# Patient Record
Sex: FEMALE | Race: WHITE | Employment: FULL TIME | URBAN - METROPOLITAN AREA
[De-identification: names, ages, dates, MRNs, and addresses within clinical notes are randomized per-mention and may not be internally consistent; named-entity substitution may affect disease eponyms.]

---

## 2017-01-10 ENCOUNTER — TELEPHONE (OUTPATIENT)
Dept: INTERNAL MEDICINE CLINIC | Facility: CLINIC | Age: 57
End: 2017-01-10

## 2017-01-10 DIAGNOSIS — C50.919 FAMILIAL CANCER OF BREAST, UNSPECIFIED LATERALITY (HCC): ICD-10-CM

## 2017-01-10 DIAGNOSIS — Z84.81 FAMILY HISTORY OF BRCA1 GENE POSITIVE: ICD-10-CM

## 2017-01-10 DIAGNOSIS — Z12.31 ENCOUNTER FOR SCREENING MAMMOGRAM FOR BREAST CANCER: Primary | ICD-10-CM

## 2017-01-10 NOTE — TELEPHONE ENCOUNTER
Mammo ordered. Mayi Martinez. Lorenzo Danielle MD  Diplomate, American Board of Internal Medicine  [facility]  [address] [facility],[address], [address], [facility], [address] [place] [address]  T: [phone]; F: Charlotte 5

## 2017-01-31 ENCOUNTER — HOSPITAL ENCOUNTER (OUTPATIENT)
Age: 57
Discharge: HOME | End: 2017-01-31
Attending: FAMILY MEDICINE
Payer: COMMERCIAL

## 2017-01-31 VITALS
TEMPERATURE: 99 F | HEART RATE: 75 BPM | SYSTOLIC BLOOD PRESSURE: 106 MMHG | DIASTOLIC BLOOD PRESSURE: 64 MMHG | HEIGHT: 66 IN | BODY MASS INDEX: 21.69 KG/M2 | WEIGHT: 135 LBS | OXYGEN SATURATION: 98 % | RESPIRATION RATE: 18 BRPM

## 2017-01-31 DIAGNOSIS — J02.9 ACUTE VIRAL PHARYNGITIS: ICD-10-CM

## 2017-01-31 DIAGNOSIS — J06.9 UPPER RESPIRATORY TRACT INFECTION, UNSPECIFIED TYPE: ICD-10-CM

## 2017-01-31 DIAGNOSIS — J98.01 ACUTE BRONCHOSPASM: Primary | ICD-10-CM

## 2017-01-31 LAB — POCT RAPID STREP: NEGATIVE

## 2017-01-31 PROCEDURE — 99214 OFFICE O/P EST MOD 30 MIN: CPT

## 2017-01-31 PROCEDURE — 94640 AIRWAY INHALATION TREATMENT: CPT

## 2017-01-31 PROCEDURE — 87430 STREP A AG IA: CPT

## 2017-01-31 PROCEDURE — 87081 CULTURE SCREEN ONLY: CPT

## 2017-01-31 RX ORDER — ALBUTEROL SULFATE 90 UG/1
2 AEROSOL, METERED RESPIRATORY (INHALATION) EVERY 4 HOURS PRN
Qty: 1 INHALER | Refills: 0 | Status: SHIPPED | OUTPATIENT
Start: 2017-01-31 | End: 2018-07-06

## 2017-01-31 RX ORDER — BENZONATATE 200 MG/1
200 CAPSULE ORAL 3 TIMES DAILY PRN
Qty: 30 CAPSULE | Refills: 0 | Status: SHIPPED | OUTPATIENT
Start: 2017-01-31 | End: 2017-02-10

## 2017-01-31 RX ORDER — PROMETHAZINE HYDROCHLORIDE AND CODEINE PHOSPHATE 6.25; 1 MG/5ML; MG/5ML
5 SYRUP ORAL NIGHTLY PRN
Qty: 120 ML | Refills: 0 | Status: SHIPPED | OUTPATIENT
Start: 2017-01-31 | End: 2017-02-10

## 2017-01-31 RX ORDER — PREDNISONE 20 MG/1
TABLET ORAL
Qty: 10 TABLET | Refills: 0 | Status: SHIPPED | OUTPATIENT
Start: 2017-01-31 | End: 2017-02-20 | Stop reason: ALTCHOICE

## 2017-01-31 RX ORDER — ALBUTEROL SULFATE 2.5 MG/3ML
2.5 SOLUTION RESPIRATORY (INHALATION) EVERY 4 HOURS PRN
Qty: 1 BOX | Refills: 0 | Status: SHIPPED | OUTPATIENT
Start: 2017-01-31 | End: 2017-02-20 | Stop reason: ALTCHOICE

## 2017-01-31 RX ORDER — PREDNISONE 20 MG/1
40 TABLET ORAL ONCE
Status: COMPLETED | OUTPATIENT
Start: 2017-01-31 | End: 2017-01-31

## 2017-01-31 RX ORDER — IPRATROPIUM BROMIDE AND ALBUTEROL SULFATE 2.5; .5 MG/3ML; MG/3ML
3 SOLUTION RESPIRATORY (INHALATION) ONCE
Status: COMPLETED | OUTPATIENT
Start: 2017-01-31 | End: 2017-01-31

## 2017-01-31 NOTE — ED PROVIDER NOTES
Patient Seen in: [facility]    History   Patient presents with:  Sore Throat    Stated Complaint: headache / Cassidy Lorenzo / chest tightness x 4 days    HPI    This 72-year-old female presents to the office with a four-day history • Cancer Sister 46     Stage 4 Ovarian CA   • Ovarian Cancer Sister 43   • Breast Cancer Sister      one sis ovarina ca @52/other sis breast ca 43         Smoking Status: Never Smoker                      Smokeless Status: Never Used while in the office. Repeat lung exam shows increased air exchange. Peak flows have improved. Patient states her chest tightness has improved. She is stable for discharge to home.     Symptomatic treatment for URI, sore throat and bronchospasm is discus bronchospasm    benzonatate 200 MG Oral Cap  Take 1 capsule (200 mg total) by mouth 3 (three) times daily as needed for cough. Qty: 30 capsule Refills: 0  Associated Diagnoses:Upper respiratory tract infection, unspecified type    promethazine-codeine 6. 2

## 2017-01-31 NOTE — ED INITIAL ASSESSMENT (HPI)
Pt began 4 days ago with sore throat chills and sweats, and a congested tight chest that wont break up.   She has been on tylenol and getting headaches

## 2017-02-20 ENCOUNTER — LAB ENCOUNTER (OUTPATIENT)
Dept: LAB | Age: 57
End: 2017-02-20
Attending: INTERNAL MEDICINE
Payer: COMMERCIAL

## 2017-02-20 ENCOUNTER — HOSPITAL ENCOUNTER (OUTPATIENT)
Dept: MAMMOGRAPHY | Age: 57
Discharge: HOME | End: 2017-02-20
Attending: INTERNAL MEDICINE
Payer: COMMERCIAL

## 2017-02-20 ENCOUNTER — OFFICE VISIT (OUTPATIENT)
Dept: INTERNAL MEDICINE CLINIC | Facility: CLINIC | Age: 57
End: 2017-02-20

## 2017-02-20 VITALS
DIASTOLIC BLOOD PRESSURE: 68 MMHG | HEART RATE: 74 BPM | BODY MASS INDEX: 23 KG/M2 | RESPIRATION RATE: 12 BRPM | OXYGEN SATURATION: 96 % | SYSTOLIC BLOOD PRESSURE: 108 MMHG | TEMPERATURE: 98 F | WEIGHT: 142.5 LBS

## 2017-02-20 DIAGNOSIS — Z84.81 FAMILY HISTORY OF BRCA1 GENE POSITIVE: ICD-10-CM

## 2017-02-20 DIAGNOSIS — Z00.00 ROUTINE GENERAL MEDICAL EXAMINATION AT A HEALTH CARE FACILITY: ICD-10-CM

## 2017-02-20 DIAGNOSIS — Z00.00 ROUTINE GENERAL MEDICAL EXAMINATION AT A HEALTH CARE FACILITY: Primary | ICD-10-CM

## 2017-02-20 DIAGNOSIS — Z12.31 ENCOUNTER FOR SCREENING MAMMOGRAM FOR BREAST CANCER: ICD-10-CM

## 2017-02-20 DIAGNOSIS — C50.919 FAMILIAL CANCER OF BREAST, UNSPECIFIED LATERALITY (HCC): ICD-10-CM

## 2017-02-20 LAB
ALBUMIN SERPL-MCNC: 4.2 G/DL (ref 3.5–4.8)
ALP LIVER SERPL-CCNC: 70 U/L (ref 41–108)
ALT SERPL-CCNC: 29 U/L (ref 14–54)
AST SERPL-CCNC: 21 U/L (ref 15–41)
BASOPHILS # BLD AUTO: 0.08 X10(3) UL (ref 0–0.1)
BASOPHILS NFR BLD AUTO: 1.2 %
BILIRUB SERPL-MCNC: 0.8 MG/DL (ref 0.1–2)
BILIRUB UR QL STRIP.AUTO: NEGATIVE
BUN BLD-MCNC: 20 MG/DL (ref 8–20)
CALCIUM BLD-MCNC: 9.4 MG/DL (ref 8.3–10.3)
CHLORIDE: 107 MMOL/L (ref 101–111)
CHOLEST SMN-MCNC: 222 MG/DL (ref ?–200)
CO2: 29 MMOL/L (ref 22–32)
COLOR UR AUTO: YELLOW
CREAT BLD-MCNC: 0.81 MG/DL (ref 0.55–1.02)
EOSINOPHIL # BLD AUTO: 0.2 X10(3) UL (ref 0–0.3)
EOSINOPHIL NFR BLD AUTO: 3 %
ERYTHROCYTE [DISTWIDTH] IN BLOOD BY AUTOMATED COUNT: 13.3 % (ref 11.5–16)
EST. AVERAGE GLUCOSE BLD GHB EST-MCNC: 120 MG/DL (ref 68–126)
GLUCOSE BLD-MCNC: 94 MG/DL (ref 70–99)
GLUCOSE UR STRIP.AUTO-MCNC: NEGATIVE MG/DL
HBA1C MFR BLD HPLC: 5.8 % (ref ?–5.7)
HCT VFR BLD AUTO: 40.7 % (ref 34–50)
HDLC SERPL-MCNC: 82 MG/DL (ref 45–?)
HDLC SERPL: 2.71 {RATIO} (ref ?–4.44)
HGB BLD-MCNC: 13.3 G/DL (ref 12–16)
IMMATURE GRANULOCYTE COUNT: 0.01 X10(3) UL (ref 0–1)
IMMATURE GRANULOCYTE RATIO %: 0.1 %
KETONES UR STRIP.AUTO-MCNC: NEGATIVE MG/DL
LDLC SERPL CALC-MCNC: 127 MG/DL (ref ?–130)
LEUKOCYTE ESTERASE UR QL STRIP.AUTO: NEGATIVE
LYMPHOCYTES # BLD AUTO: 2.15 X10(3) UL (ref 0.9–4)
LYMPHOCYTES NFR BLD AUTO: 32 %
M PROTEIN MFR SERPL ELPH: 7.6 G/DL (ref 6.1–8.3)
MCH RBC QN AUTO: 29.5 PG (ref 27–33.2)
MCHC RBC AUTO-ENTMCNC: 32.7 G/DL (ref 31–37)
MCV RBC AUTO: 90.2 FL (ref 81–100)
MONOCYTES # BLD AUTO: 0.6 X10(3) UL (ref 0.1–0.6)
MONOCYTES NFR BLD AUTO: 8.9 %
NEUTROPHIL ABS PRELIM: 3.67 X10 (3) UL (ref 1.3–6.7)
NEUTROPHILS # BLD AUTO: 3.67 X10(3) UL (ref 1.3–6.7)
NEUTROPHILS NFR BLD AUTO: 54.8 %
NITRITE UR QL STRIP.AUTO: NEGATIVE
NONHDLC SERPL-MCNC: 140 MG/DL (ref ?–130)
PH UR STRIP.AUTO: 7 [PH] (ref 4.5–8)
PLATELET # BLD AUTO: 357 10(3)UL (ref 150–450)
POTASSIUM SERPL-SCNC: 4.4 MMOL/L (ref 3.6–5.1)
PROT UR STRIP.AUTO-MCNC: NEGATIVE MG/DL
RBC # BLD AUTO: 4.51 X10(6)UL (ref 3.8–5.1)
RBC UR QL AUTO: NEGATIVE
RED CELL DISTRIBUTION WIDTH-SD: 43.6 FL (ref 35.1–46.3)
SODIUM SERPL-SCNC: 140 MMOL/L (ref 136–144)
SP GR UR STRIP.AUTO: 1.02 (ref 1–1.03)
TRIGLYCERIDES: 67 MG/DL (ref ?–150)
TSI SER-ACNC: 1.31 MIU/ML (ref 0.35–5.5)
UROBILINOGEN UR STRIP.AUTO-MCNC: <2 MG/DL
VLDL: 13 MG/DL (ref 5–40)
WBC # BLD AUTO: 6.7 X10(3) UL (ref 4–13)

## 2017-02-20 PROCEDURE — 80053 COMPREHEN METABOLIC PANEL: CPT

## 2017-02-20 PROCEDURE — 80061 LIPID PANEL: CPT

## 2017-02-20 PROCEDURE — 82306 VITAMIN D 25 HYDROXY: CPT

## 2017-02-20 PROCEDURE — 83036 HEMOGLOBIN GLYCOSYLATED A1C: CPT

## 2017-02-20 PROCEDURE — 81003 URINALYSIS AUTO W/O SCOPE: CPT

## 2017-02-20 PROCEDURE — 85025 COMPLETE CBC W/AUTO DIFF WBC: CPT

## 2017-02-20 PROCEDURE — 77063 BREAST TOMOSYNTHESIS BI: CPT

## 2017-02-20 PROCEDURE — 77067 SCR MAMMO BI INCL CAD: CPT

## 2017-02-20 PROCEDURE — 84443 ASSAY THYROID STIM HORMONE: CPT

## 2017-02-20 PROCEDURE — 36415 COLL VENOUS BLD VENIPUNCTURE: CPT

## 2017-02-20 PROCEDURE — 99396 PREV VISIT EST AGE 40-64: CPT | Performed by: INTERNAL MEDICINE

## 2017-02-20 NOTE — PROGRESS NOTES
Patient presents with:  CPX: WWE      HPI: The pt presents today for WWE minus the pap and minus the CBE. Doing well. Due for wellness labs. On the schedule to get updated mammo today. Last pap was normal in 1/2016.   Her health goals still center aroun 96%  LMP 08/01/2012   Wt Readings from Last 6 Encounters:  02/20/17 : 142 lb 8 oz  01/31/17 : 135 lb  10/27/16 : 141 lb 8 oz  01/04/16 : 135 lb 12 oz  08/06/15 : 140 lb  11/19/14 : 142 lb 3.2 oz  Gen - A&Ox3, NAD  HEENT - PERRL, EOMI, OP is clear; TMs lisa

## 2017-02-21 PROBLEM — E78.00 HYPERCHOLESTEROLEMIA: Status: ACTIVE | Noted: 2017-02-21

## 2017-02-21 LAB — 25-HYDROXYVITAMIN D (TOTAL): 34.5 NG/ML (ref 30–100)

## 2017-06-07 DIAGNOSIS — M54.12 CERVICAL RADICULOPATHY: Primary | ICD-10-CM

## 2017-06-07 RX ORDER — GABAPENTIN 300 MG/1
CAPSULE ORAL
Qty: 60 CAPSULE | Refills: 0 | Status: SHIPPED | OUTPATIENT
Start: 2017-06-07 | End: 2018-07-06

## 2017-06-17 ENCOUNTER — TELEPHONE (OUTPATIENT)
Dept: INTERNAL MEDICINE CLINIC | Facility: CLINIC | Age: 57
End: 2017-06-17

## 2017-06-17 DIAGNOSIS — H33.312 RETINAL TEAR OF LEFT EYE: Primary | ICD-10-CM

## 2017-06-17 NOTE — TELEPHONE ENCOUNTER
Patient saw her eye dr this morning - has a L eye retinal tear. Seeing retinal specialist this afternoon for laser treatment. Requesting referral for Dr. Candis Moran -- entered in [facility] and also faxed to [facility] office at [phone].     FREDI Chen-

## 2017-06-27 DIAGNOSIS — M54.12 CERVICAL RADICULOPATHY: ICD-10-CM

## 2017-06-27 NOTE — TELEPHONE ENCOUNTER
Medication: Gabapentin    Date of last refill: 6/7/17  [place]    Last office visit: n/a  Due back to clinic per last office note:  n/a  Date next office visit scheduled:  None scheduled    Last OV note recommendatio

## 2017-07-03 RX ORDER — GABAPENTIN 300 MG/1
CAPSULE ORAL
Qty: 60 CAPSULE | Refills: 0 | OUTPATIENT
Start: 2017-07-03

## 2017-11-02 ENCOUNTER — APPOINTMENT (OUTPATIENT)
Dept: OTHER | Facility: HOSPITAL | Age: 57
End: 2017-11-02
Attending: PREVENTIVE MEDICINE
Payer: OTHER MISCELLANEOUS

## 2017-11-06 ENCOUNTER — APPOINTMENT (OUTPATIENT)
Dept: OTHER | Facility: HOSPITAL | Age: 57
End: 2017-11-06
Attending: PREVENTIVE MEDICINE
Payer: OTHER MISCELLANEOUS

## 2017-11-13 PROBLEM — H83.3X3: Status: ACTIVE | Noted: 2017-11-13

## 2017-11-13 PROBLEM — H90.5 SENSORINEURAL HEARING LOSS: Status: ACTIVE | Noted: 2017-11-13

## 2018-02-22 RX ORDER — CITALOPRAM 40 MG/1
TABLET ORAL
Qty: 90 TABLET | Refills: 2 | Status: SHIPPED | OUTPATIENT
Start: 2018-02-22 | End: 2018-07-06

## 2018-02-22 NOTE — TELEPHONE ENCOUNTER
Medication(s) to Refill:   Pending Prescriptions Disp Refills    CITALOPRAM HYDROBROMIDE 40 MG Oral Tab [Pharmacy Med Name: Citalopram Hydrobromide Oral Tablet 40 MG] 90 tablet 2     Sig: TAKE 1 TABLET BY MOUTH IN THE MORNING            Last Time Medicatio

## 2018-04-10 ENCOUNTER — TELEPHONE (OUTPATIENT)
Dept: INTERNAL MEDICINE CLINIC | Facility: CLINIC | Age: 58
End: 2018-04-10

## 2018-04-10 DIAGNOSIS — Z12.31 SCREENING MAMMOGRAM, ENCOUNTER FOR: Primary | ICD-10-CM

## 2018-04-10 NOTE — TELEPHONE ENCOUNTER
Test ordered. Notify pt. Arabella Medal. Arin Dunham MD  Diplomate, American Board of Internal Medicine  [address] [facility]  [address], [address], [facility], [address]  T: [phone]; F: Hafkevineti 5

## 2018-05-14 PROBLEM — M17.11 OSTEOARTHRITIS OF RIGHT KNEE, UNSPECIFIED OSTEOARTHRITIS TYPE: Status: ACTIVE | Noted: 2018-05-14

## 2018-05-14 PROBLEM — M17.12 OSTEOARTHRITIS OF LEFT KNEE, UNSPECIFIED OSTEOARTHRITIS TYPE: Status: ACTIVE | Noted: 2018-05-14

## 2018-07-06 ENCOUNTER — HOSPITAL ENCOUNTER (OUTPATIENT)
Dept: MAMMOGRAPHY | Age: 58
Discharge: HOME | End: 2018-07-06
Attending: INTERNAL MEDICINE
Payer: COMMERCIAL

## 2018-07-06 ENCOUNTER — OFFICE VISIT (OUTPATIENT)
Dept: INTERNAL MEDICINE CLINIC | Facility: CLINIC | Age: 58
End: 2018-07-06

## 2018-07-06 ENCOUNTER — LAB ENCOUNTER (OUTPATIENT)
Dept: LAB | Age: 58
End: 2018-07-06
Attending: INTERNAL MEDICINE
Payer: COMMERCIAL

## 2018-07-06 ENCOUNTER — HOSPITAL ENCOUNTER (OUTPATIENT)
Dept: BONE DENSITY | Age: 58
Discharge: HOME | End: 2018-07-06
Attending: INTERNAL MEDICINE
Payer: COMMERCIAL

## 2018-07-06 VITALS
DIASTOLIC BLOOD PRESSURE: 70 MMHG | RESPIRATION RATE: 16 BRPM | WEIGHT: 146 LBS | BODY MASS INDEX: 23.46 KG/M2 | HEART RATE: 80 BPM | HEIGHT: 66 IN | SYSTOLIC BLOOD PRESSURE: 100 MMHG | TEMPERATURE: 98 F

## 2018-07-06 DIAGNOSIS — Z00.00 ROUTINE GENERAL MEDICAL EXAMINATION AT A HEALTH CARE FACILITY: ICD-10-CM

## 2018-07-06 DIAGNOSIS — Z80.41 FAMILY HISTORY OF OVARIAN CANCER: ICD-10-CM

## 2018-07-06 DIAGNOSIS — Z13.820 SCREENING FOR OSTEOPOROSIS: ICD-10-CM

## 2018-07-06 DIAGNOSIS — R73.03 PREDIABETES: ICD-10-CM

## 2018-07-06 DIAGNOSIS — Z00.00 ROUTINE GENERAL MEDICAL EXAMINATION AT A HEALTH CARE FACILITY: Primary | ICD-10-CM

## 2018-07-06 DIAGNOSIS — E78.00 HYPERCHOLESTEROLEMIA: ICD-10-CM

## 2018-07-06 DIAGNOSIS — Z12.31 SCREENING MAMMOGRAM, ENCOUNTER FOR: ICD-10-CM

## 2018-07-06 PROBLEM — H83.3X3: Status: RESOLVED | Noted: 2017-11-13 | Resolved: 2018-07-06

## 2018-07-06 LAB
25-HYDROXYVITAMIN D (TOTAL): 28.9 NG/ML (ref 30–100)
ALBUMIN SERPL-MCNC: 4.2 G/DL (ref 3.5–4.8)
ALP LIVER SERPL-CCNC: 61 U/L (ref 41–108)
ALT SERPL-CCNC: 28 U/L (ref 14–54)
AST SERPL-CCNC: 26 U/L (ref 15–41)
BASOPHILS # BLD AUTO: 0.06 X10(3) UL (ref 0–0.1)
BASOPHILS NFR BLD AUTO: 0.8 %
BILIRUB SERPL-MCNC: 0.7 MG/DL (ref 0.1–2)
BILIRUB UR QL STRIP.AUTO: NEGATIVE
BUN BLD-MCNC: 20 MG/DL (ref 8–20)
CALCIUM BLD-MCNC: 9.1 MG/DL (ref 8.3–10.3)
CANCER AG 125: 22.6 U/ML (ref ?–35)
CHLORIDE: 105 MMOL/L (ref 101–111)
CHOLEST SMN-MCNC: 190 MG/DL (ref ?–200)
CO2: 27 MMOL/L (ref 22–32)
COLOR UR AUTO: YELLOW
CREAT BLD-MCNC: 0.73 MG/DL (ref 0.55–1.02)
EOSINOPHIL # BLD AUTO: 0.15 X10(3) UL (ref 0–0.3)
EOSINOPHIL NFR BLD AUTO: 2 %
ERYTHROCYTE [DISTWIDTH] IN BLOOD BY AUTOMATED COUNT: 12.7 % (ref 11.5–16)
EST. AVERAGE GLUCOSE BLD GHB EST-MCNC: 114 MG/DL (ref 68–126)
GLUCOSE BLD-MCNC: 78 MG/DL (ref 70–99)
GLUCOSE UR STRIP.AUTO-MCNC: NEGATIVE MG/DL
HBA1C MFR BLD HPLC: 5.6 % (ref ?–5.7)
HCT VFR BLD AUTO: 40.3 % (ref 34–50)
HDLC SERPL-MCNC: 71 MG/DL (ref 45–?)
HDLC SERPL: 2.68 {RATIO} (ref ?–4.44)
HGB BLD-MCNC: 13 G/DL (ref 12–16)
IMMATURE GRANULOCYTE COUNT: 0.02 X10(3) UL (ref 0–1)
IMMATURE GRANULOCYTE RATIO %: 0.3 %
KETONES UR STRIP.AUTO-MCNC: NEGATIVE MG/DL
LDLC SERPL CALC-MCNC: 106 MG/DL (ref ?–130)
LEUKOCYTE ESTERASE UR QL STRIP.AUTO: NEGATIVE
LYMPHOCYTES # BLD AUTO: 2.35 X10(3) UL (ref 0.9–4)
LYMPHOCYTES NFR BLD AUTO: 31.5 %
M PROTEIN MFR SERPL ELPH: 7.3 G/DL (ref 6.1–8.3)
MCH RBC QN AUTO: 28.9 PG (ref 27–33.2)
MCHC RBC AUTO-ENTMCNC: 32.3 G/DL (ref 31–37)
MCV RBC AUTO: 89.6 FL (ref 81–100)
MONOCYTES # BLD AUTO: 0.6 X10(3) UL (ref 0.1–1)
MONOCYTES NFR BLD AUTO: 8.1 %
NEUTROPHIL ABS PRELIM: 4.27 X10 (3) UL (ref 1.3–6.7)
NEUTROPHILS # BLD AUTO: 4.27 X10(3) UL (ref 1.3–6.7)
NEUTROPHILS NFR BLD AUTO: 57.3 %
NITRITE UR QL STRIP.AUTO: NEGATIVE
NONHDLC SERPL-MCNC: 119 MG/DL (ref ?–130)
PH UR STRIP.AUTO: 7 [PH] (ref 4.5–8)
PLATELET # BLD AUTO: 346 10(3)UL (ref 150–450)
POTASSIUM SERPL-SCNC: 4.5 MMOL/L (ref 3.6–5.1)
PROT UR STRIP.AUTO-MCNC: NEGATIVE MG/DL
RBC # BLD AUTO: 4.5 X10(6)UL (ref 3.8–5.1)
RBC UR QL AUTO: NEGATIVE
RED CELL DISTRIBUTION WIDTH-SD: 42.1 FL (ref 35.1–46.3)
SODIUM SERPL-SCNC: 140 MMOL/L (ref 136–144)
SP GR UR STRIP.AUTO: 1.02 (ref 1–1.03)
TRIGL SERPL-MCNC: 63 MG/DL (ref ?–150)
TSI SER-ACNC: 1.34 MIU/ML (ref 0.35–5.5)
UROBILINOGEN UR STRIP.AUTO-MCNC: <2 MG/DL
VLDLC SERPL CALC-MCNC: 13 MG/DL (ref 5–40)
WBC # BLD AUTO: 7.5 X10(3) UL (ref 4–13)

## 2018-07-06 PROCEDURE — 36415 COLL VENOUS BLD VENIPUNCTURE: CPT

## 2018-07-06 PROCEDURE — 83036 HEMOGLOBIN GLYCOSYLATED A1C: CPT

## 2018-07-06 PROCEDURE — 80061 LIPID PANEL: CPT

## 2018-07-06 PROCEDURE — 85025 COMPLETE CBC W/AUTO DIFF WBC: CPT

## 2018-07-06 PROCEDURE — 77067 SCR MAMMO BI INCL CAD: CPT | Performed by: INTERNAL MEDICINE

## 2018-07-06 PROCEDURE — 82306 VITAMIN D 25 HYDROXY: CPT

## 2018-07-06 PROCEDURE — 77063 BREAST TOMOSYNTHESIS BI: CPT | Performed by: INTERNAL MEDICINE

## 2018-07-06 PROCEDURE — 77080 DXA BONE DENSITY AXIAL: CPT | Performed by: INTERNAL MEDICINE

## 2018-07-06 PROCEDURE — 84443 ASSAY THYROID STIM HORMONE: CPT

## 2018-07-06 PROCEDURE — 86304 IMMUNOASSAY TUMOR CA 125: CPT

## 2018-07-06 PROCEDURE — 81003 URINALYSIS AUTO W/O SCOPE: CPT

## 2018-07-06 PROCEDURE — 80053 COMPREHEN METABOLIC PANEL: CPT

## 2018-07-06 PROCEDURE — 99396 PREV VISIT EST AGE 40-64: CPT | Performed by: INTERNAL MEDICINE

## 2018-07-06 RX ORDER — CITALOPRAM 40 MG/1
TABLET ORAL
Qty: 90 TABLET | Refills: 3 | Status: SHIPPED | OUTPATIENT
Start: 2018-07-06 | End: 2019-10-01

## 2018-07-06 NOTE — PROGRESS NOTES
Patient presents with: Well Adult: pt is scheduled for Mammo and Bmd today, pt is fasting   Gyn Exam: last pap 1/4/16      HPI: The pt presents today for WWE minus the pap and minus the CBE. She's actually scheduled for mammo and DEXA later on today.   Du [OTHER] Daughter          Immunization History  Administered            Date(s) Administered    Influenza             10/09/2015  10/05/2016      Zoster Vaccine Live (Zostavax)                          01/04/2016        PE:  /70   Pulse 80   Temp 97.

## 2018-07-17 ENCOUNTER — TELEPHONE (OUTPATIENT)
Dept: INTERNAL MEDICINE CLINIC | Facility: CLINIC | Age: 58
End: 2018-07-17

## 2018-07-17 NOTE — TELEPHONE ENCOUNTER
Patient returning phone call. Nurse unavailable to take call. Please call patient when time permits. Pt is at work scrubbed in she stated and is unable to attend a phone call at any hour of the day.     To call pt back ask to be connected to the operat

## 2019-03-21 ENCOUNTER — APPOINTMENT (OUTPATIENT)
Dept: GENERAL RADIOLOGY | Age: 59
End: 2019-03-21
Attending: FAMILY MEDICINE
Payer: COMMERCIAL

## 2019-03-21 ENCOUNTER — HOSPITAL ENCOUNTER (OUTPATIENT)
Age: 59
Discharge: HOME | End: 2019-03-21
Attending: FAMILY MEDICINE
Payer: COMMERCIAL

## 2019-03-21 VITALS
RESPIRATION RATE: 16 BRPM | OXYGEN SATURATION: 97 % | TEMPERATURE: 98 F | SYSTOLIC BLOOD PRESSURE: 114 MMHG | DIASTOLIC BLOOD PRESSURE: 75 MMHG | HEART RATE: 94 BPM

## 2019-03-21 DIAGNOSIS — H66.002 NON-RECURRENT ACUTE SUPPURATIVE OTITIS MEDIA OF LEFT EAR WITHOUT SPONTANEOUS RUPTURE OF TYMPANIC MEMBRANE: Primary | ICD-10-CM

## 2019-03-21 DIAGNOSIS — H10.32 ACUTE CONJUNCTIVITIS OF LEFT EYE, UNSPECIFIED ACUTE CONJUNCTIVITIS TYPE: ICD-10-CM

## 2019-03-21 DIAGNOSIS — J40 BRONCHITIS: ICD-10-CM

## 2019-03-21 PROCEDURE — 99214 OFFICE O/P EST MOD 30 MIN: CPT

## 2019-03-21 PROCEDURE — 71046 X-RAY EXAM CHEST 2 VIEWS: CPT | Performed by: FAMILY MEDICINE

## 2019-03-21 PROCEDURE — 94640 AIRWAY INHALATION TREATMENT: CPT

## 2019-03-21 RX ORDER — TOBRAMYCIN 3 MG/ML
2 SOLUTION/ DROPS OPHTHALMIC EVERY 4 HOURS
Qty: 1 BOTTLE | Refills: 0 | Status: SHIPPED | OUTPATIENT
Start: 2019-03-21 | End: 2019-03-22

## 2019-03-21 RX ORDER — SOFT LENS DISINFECTANT
SOLUTION, NON-ORAL MISCELLANEOUS
COMMUNITY
End: 2019-10-01 | Stop reason: ALTCHOICE

## 2019-03-21 RX ORDER — ALBUTEROL SULFATE 90 UG/1
2 AEROSOL, METERED RESPIRATORY (INHALATION) EVERY 6 HOURS PRN
Qty: 1 INHALER | Refills: 0 | Status: SHIPPED | OUTPATIENT
Start: 2019-03-21 | End: 2021-10-25

## 2019-03-21 RX ORDER — PREDNISONE 20 MG/1
40 TABLET ORAL DAILY
Qty: 10 TABLET | Refills: 0 | Status: SHIPPED | OUTPATIENT
Start: 2019-03-21 | End: 2019-03-26

## 2019-03-21 RX ORDER — IPRATROPIUM BROMIDE AND ALBUTEROL SULFATE 2.5; .5 MG/3ML; MG/3ML
3 SOLUTION RESPIRATORY (INHALATION) ONCE
Status: COMPLETED | OUTPATIENT
Start: 2019-03-21 | End: 2019-03-21

## 2019-03-21 RX ORDER — ACETAMINOPHEN 500 MG
500 TABLET ORAL EVERY 6 HOURS PRN
COMMUNITY
End: 2019-07-08 | Stop reason: ALTCHOICE

## 2019-03-21 RX ORDER — ALBUTEROL SULFATE 2.5 MG/3ML
2.5 SOLUTION RESPIRATORY (INHALATION) EVERY 4 HOURS PRN
Qty: 30 AMPULE | Refills: 0 | Status: SHIPPED | OUTPATIENT
Start: 2019-03-21 | End: 2019-04-20

## 2019-03-21 RX ORDER — AMOXICILLIN AND CLAVULANATE POTASSIUM 875; 125 MG/1; MG/1
1 TABLET, FILM COATED ORAL 2 TIMES DAILY
Qty: 20 TABLET | Refills: 0 | Status: SHIPPED | OUTPATIENT
Start: 2019-03-21 | End: 2019-03-31

## 2019-03-21 RX ORDER — ALBUTEROL SULFATE 2.5 MG/3ML
SOLUTION RESPIRATORY (INHALATION) EVERY 6 HOURS PRN
COMMUNITY
End: 2019-03-21

## 2019-03-21 NOTE — ED PROVIDER NOTES
Patient Seen in: [facility] In [facility]    History   Patient presents with:  Cough/URI  Ear Problem Pain (neurosensory)    Stated Complaint: possible flu    HPI    82-year-old female presents for nasal congestion, left ear pain, cough and f Left Ear: External ear normal. Tympanic membrane is erythematous. Nose: Nose normal.   Mouth/Throat: Oropharynx is clear and moist.   Eyes: EOM and lids are normal. Pupils are equal, round, and reactive to light.  Lids are everted and swept, no foreign the lungs every 6 (six) hours as needed for Wheezing. Qty: 1 Inhaler Refills: 0    Tobramycin Sulfate 0.3 % Ophthalmic Solution  Place 2 drops into the left eye every 4 (four) hours.   Qty: 1 Bottle Refills: 0    predniSONE 20 MG Oral Tab  Take 2 tablets (

## 2019-03-21 NOTE — ED INITIAL ASSESSMENT (HPI)
Patient presents with 6 day h/o cough nonproductive,left ear pain with clear drainage,left eye redness,headache. Did get flu shot-Recent travel to [employer] worker.

## 2019-03-22 RX ORDER — OFLOXACIN 3 MG/ML
2 SOLUTION/ DROPS OPHTHALMIC 4 TIMES DAILY
Qty: 1 BOTTLE | Refills: 0 | Status: SHIPPED | OUTPATIENT
Start: 2019-03-22 | End: 2019-03-29

## 2019-07-08 ENCOUNTER — HOSPITAL ENCOUNTER (OUTPATIENT)
Age: 59
Discharge: HOME | End: 2019-07-08
Attending: FAMILY MEDICINE
Payer: COMMERCIAL

## 2019-07-08 VITALS
TEMPERATURE: 98 F | RESPIRATION RATE: 18 BRPM | HEART RATE: 73 BPM | SYSTOLIC BLOOD PRESSURE: 101 MMHG | OXYGEN SATURATION: 97 % | DIASTOLIC BLOOD PRESSURE: 70 MMHG

## 2019-07-08 DIAGNOSIS — L30.9 DERMATITIS: Primary | ICD-10-CM

## 2019-07-08 PROCEDURE — 99213 OFFICE O/P EST LOW 20 MIN: CPT

## 2019-07-08 PROCEDURE — 99214 OFFICE O/P EST MOD 30 MIN: CPT

## 2019-07-08 RX ORDER — FLUOCINONIDE 1 MG/G
CREAM TOPICAL
Qty: 30 G | Refills: 0 | Status: SHIPPED | OUTPATIENT
Start: 2019-07-08 | End: 2021-10-25

## 2019-07-08 RX ORDER — METHYLPREDNISOLONE 4 MG/1
TABLET ORAL
Qty: 1 PACKAGE | Refills: 0 | Status: SHIPPED | OUTPATIENT
Start: 2019-07-08 | End: 2019-10-01 | Stop reason: ALTCHOICE

## 2019-07-08 RX ORDER — FLUTICASONE PROPIONATE 50 MCG
SPRAY, SUSPENSION (ML) NASAL DAILY
COMMUNITY
End: 2020-10-24

## 2019-07-08 NOTE — ED INITIAL ASSESSMENT (HPI)
Complains of rash at multiple areas of body for the last four days. States has had multiple episodes of rashes that goes away after Prednisone treatment but then comes back again. Has had three episodes per month in last three months.

## 2019-07-08 NOTE — ED PROVIDER NOTES
Patient Seen in: [facility]    History   Patient presents with:  Rash Skin Problem (integumentary)    Stated Complaint: rash    HPI    66year old female presents for rash.  States she has recurrent patches of rash for past 3 mon ear normal.   Left Ear: External ear normal.   Nose: Nose normal.   Mouth/Throat: Oropharynx is clear and moist.   Eyes: Pupils are equal, round, and reactive to light. Conjunctivae and EOM are normal.   Neck: Normal range of motion. Neck supple.    Cardiov

## 2019-07-24 ENCOUNTER — TELEPHONE (OUTPATIENT)
Dept: INTERNAL MEDICINE CLINIC | Facility: CLINIC | Age: 59
End: 2019-07-24

## 2019-07-24 DIAGNOSIS — Z12.39 SCREENING FOR BREAST CANCER: Primary | ICD-10-CM

## 2019-07-24 NOTE — TELEPHONE ENCOUNTER
Patient calling in requesting for her mammogram order to be paced. Patient also wondering if lab orders can be placed as well, if not she will wait until her Annual Physical appointment which is on 10/01/19. Please call pt with order status.

## 2019-07-28 NOTE — TELEPHONE ENCOUNTER
Mammo order signed. We can wait until the time of her physical to get blood orders. Josselyn Nieto. Suhas Leblanc MD  Diplomate, American Board of Internal Medicine  [address] [address] [address]  [address] [place] [address],[address], [address], [facility], [address] [place] [address]  T: [phone]; F: [phone]

## 2019-10-01 ENCOUNTER — LAB ENCOUNTER (OUTPATIENT)
Dept: LAB | Age: 59
End: 2019-10-01
Attending: INTERNAL MEDICINE
Payer: COMMERCIAL

## 2019-10-01 ENCOUNTER — OFFICE VISIT (OUTPATIENT)
Dept: INTERNAL MEDICINE CLINIC | Facility: CLINIC | Age: 59
End: 2019-10-01
Payer: COMMERCIAL

## 2019-10-01 ENCOUNTER — HOSPITAL ENCOUNTER (OUTPATIENT)
Dept: MAMMOGRAPHY | Age: 59
Discharge: HOME | End: 2019-10-01
Attending: INTERNAL MEDICINE
Payer: COMMERCIAL

## 2019-10-01 VITALS
SYSTOLIC BLOOD PRESSURE: 100 MMHG | TEMPERATURE: 98 F | DIASTOLIC BLOOD PRESSURE: 60 MMHG | HEART RATE: 82 BPM | OXYGEN SATURATION: 97 % | RESPIRATION RATE: 16 BRPM | BODY MASS INDEX: 22.9 KG/M2 | WEIGHT: 142.5 LBS | HEIGHT: 66 IN

## 2019-10-01 DIAGNOSIS — Z00.00 ROUTINE GENERAL MEDICAL EXAMINATION AT A HEALTH CARE FACILITY: Primary | ICD-10-CM

## 2019-10-01 DIAGNOSIS — Z80.41 FAMILY HISTORY OF OVARIAN CANCER: ICD-10-CM

## 2019-10-01 DIAGNOSIS — Z12.4 SCREENING FOR MALIGNANT NEOPLASM OF CERVIX: ICD-10-CM

## 2019-10-01 DIAGNOSIS — Z23 FLU VACCINE NEED: ICD-10-CM

## 2019-10-01 DIAGNOSIS — Z00.00 ROUTINE GENERAL MEDICAL EXAMINATION AT A HEALTH CARE FACILITY: ICD-10-CM

## 2019-10-01 DIAGNOSIS — Z12.39 SCREENING FOR BREAST CANCER: ICD-10-CM

## 2019-10-01 PROBLEM — H35.352 CYSTOID MACULAR EDEMA OF LEFT EYE: Status: ACTIVE | Noted: 2019-10-01

## 2019-10-01 PROBLEM — F32.9 MDD (MAJOR DEPRESSIVE DISORDER): Status: ACTIVE | Noted: 2019-10-01

## 2019-10-01 PROCEDURE — 84443 ASSAY THYROID STIM HORMONE: CPT

## 2019-10-01 PROCEDURE — 80061 LIPID PANEL: CPT

## 2019-10-01 PROCEDURE — 80053 COMPREHEN METABOLIC PANEL: CPT

## 2019-10-01 PROCEDURE — 99396 PREV VISIT EST AGE 40-64: CPT | Performed by: INTERNAL MEDICINE

## 2019-10-01 PROCEDURE — 77063 BREAST TOMOSYNTHESIS BI: CPT | Performed by: INTERNAL MEDICINE

## 2019-10-01 PROCEDURE — 82306 VITAMIN D 25 HYDROXY: CPT

## 2019-10-01 PROCEDURE — 88175 CYTOPATH C/V AUTO FLUID REDO: CPT | Performed by: INTERNAL MEDICINE

## 2019-10-01 PROCEDURE — 77067 SCR MAMMO BI INCL CAD: CPT | Performed by: INTERNAL MEDICINE

## 2019-10-01 PROCEDURE — 36415 COLL VENOUS BLD VENIPUNCTURE: CPT

## 2019-10-01 PROCEDURE — 90471 IMMUNIZATION ADMIN: CPT | Performed by: INTERNAL MEDICINE

## 2019-10-01 PROCEDURE — 83036 HEMOGLOBIN GLYCOSYLATED A1C: CPT

## 2019-10-01 PROCEDURE — 87624 HPV HI-RISK TYP POOLED RSLT: CPT | Performed by: INTERNAL MEDICINE

## 2019-10-01 PROCEDURE — 81001 URINALYSIS AUTO W/SCOPE: CPT

## 2019-10-01 PROCEDURE — 85025 COMPLETE CBC W/AUTO DIFF WBC: CPT

## 2019-10-01 PROCEDURE — 86304 IMMUNOASSAY TUMOR CA 125: CPT

## 2019-10-01 PROCEDURE — 90686 IIV4 VACC NO PRSV 0.5 ML IM: CPT | Performed by: INTERNAL MEDICINE

## 2019-10-01 RX ORDER — CITALOPRAM 40 MG/1
TABLET ORAL
Qty: 90 TABLET | Refills: 3 | Status: SHIPPED | OUTPATIENT
Start: 2019-10-01 | End: 2020-10-24

## 2019-10-01 NOTE — PROGRESS NOTES
Patient presents with:  CPX: PAP      HPI: Joseluis Mauricio presents today for WWE. Doing well. Due for wellness labs. Health goals continue to center around longevity, vitality, and QOL. Just went for mammo this AM. Due for pap as well.     Review of Systems   Eye 0.1 % External Cream, Apply to affected area twice daily, Disp: 30 g, Rfl: 0  •  Albuterol Sulfate HFA (PROAIR HFA) 108 (90 Base) MCG/ACT Inhalation Aero Soln, Inhale 2 puffs into the lungs every 6 (six) hours as needed for Wheezing., Disp: 1 Inhaler, Rfl: A/P:  Routine general medical examination at a health care facility  (primary encounter diagnosis)  Screening for malignant neoplasm of cervix  Flu vaccine need  Family history of ovarian cancer    1. Female CPX - Stable health. Check wellness labs.

## 2019-12-26 ENCOUNTER — HOSPITAL ENCOUNTER (OUTPATIENT)
Age: 59
Discharge: HOME | End: 2019-12-26
Attending: EMERGENCY MEDICINE
Payer: COMMERCIAL

## 2019-12-26 VITALS
TEMPERATURE: 98 F | RESPIRATION RATE: 18 BRPM | BODY MASS INDEX: 22.5 KG/M2 | DIASTOLIC BLOOD PRESSURE: 59 MMHG | HEART RATE: 68 BPM | SYSTOLIC BLOOD PRESSURE: 105 MMHG | HEIGHT: 66 IN | OXYGEN SATURATION: 98 % | WEIGHT: 140 LBS

## 2019-12-26 DIAGNOSIS — K52.9 GASTROENTERITIS: Primary | ICD-10-CM

## 2019-12-26 PROCEDURE — 96361 HYDRATE IV INFUSION ADD-ON: CPT

## 2019-12-26 PROCEDURE — 99214 OFFICE O/P EST MOD 30 MIN: CPT

## 2019-12-26 PROCEDURE — 81002 URINALYSIS NONAUTO W/O SCOPE: CPT

## 2019-12-26 PROCEDURE — 81002 URINALYSIS NONAUTO W/O SCOPE: CPT | Performed by: EMERGENCY MEDICINE

## 2019-12-26 PROCEDURE — 96374 THER/PROPH/DIAG INJ IV PUSH: CPT

## 2019-12-26 PROCEDURE — 80047 BASIC METABLC PNL IONIZED CA: CPT

## 2019-12-26 PROCEDURE — 85025 COMPLETE CBC W/AUTO DIFF WBC: CPT | Performed by: EMERGENCY MEDICINE

## 2019-12-26 RX ORDER — ONDANSETRON 2 MG/ML
4 INJECTION INTRAMUSCULAR; INTRAVENOUS ONCE
Status: COMPLETED | OUTPATIENT
Start: 2019-12-26 | End: 2019-12-26

## 2019-12-26 RX ORDER — ONDANSETRON 4 MG/1
4 TABLET, ORALLY DISINTEGRATING ORAL EVERY 4 HOURS PRN
Qty: 10 TABLET | Refills: 0 | Status: SHIPPED | OUTPATIENT
Start: 2019-12-26 | End: 2020-01-02

## 2019-12-26 RX ORDER — SODIUM CHLORIDE 9 MG/ML
1000 INJECTION, SOLUTION INTRAVENOUS ONCE
Status: COMPLETED | OUTPATIENT
Start: 2019-12-26 | End: 2019-12-26

## 2019-12-26 NOTE — ED INITIAL ASSESSMENT (HPI)
4 days of emesis- approximately 7-8 emesis   Nauseated  Stomach cramping  Denies any fever  One day of diarrhea  Poor appetite and fluid intake

## 2019-12-26 NOTE — ED PROVIDER NOTES
Patient Seen in: [facility]      History   Patient presents with:  Vomiting    Stated Complaint: vomiting x 4 days    HPI    70-year-old female presents with nausea and vomiting for the past 4 days.   She reports multiple sick co BMI 22.60 kg/m²         Physical Exam    General:  Vitals as listed. No acute distress   HEENT: Sclerae anicteric. Conjunctivae show no pallor.   Oropharynx clear, mucous membranes moist   Neck: supple, no rigidity   Lungs: good air exchange and clear   H am    Follow-up:  Bruce Becerra MD  [address]  [phone]    Call           Medications Prescribed:  Current Discharge Medication List    START taking these medications    ondansetron 4 MG Oral Tablet Dispersible  Take 1

## 2020-07-14 DIAGNOSIS — Z78.9 PARTICIPANT IN HEALTH AND WELLNESS PLAN: Primary | ICD-10-CM

## 2020-08-11 ENCOUNTER — NURSE ONLY (OUTPATIENT)
Dept: LAB | Age: 60
End: 2020-08-11
Attending: PREVENTIVE MEDICINE

## 2020-08-11 DIAGNOSIS — Z78.9 PARTICIPANT IN HEALTH AND WELLNESS PLAN: ICD-10-CM

## 2020-08-11 LAB — SARS-COV-2 IGG SERPLBLD QL IA.RAPID: NEGATIVE

## 2020-08-11 PROCEDURE — 86769 SARS-COV-2 COVID-19 ANTIBODY: CPT

## 2020-09-09 ENCOUNTER — TELEPHONE (OUTPATIENT)
Dept: INTERNAL MEDICINE CLINIC | Facility: CLINIC | Age: 60
End: 2020-09-09

## 2020-09-09 DIAGNOSIS — Z00.00 ROUTINE GENERAL MEDICAL EXAMINATION AT A HEALTH CARE FACILITY: Primary | ICD-10-CM

## 2020-09-09 DIAGNOSIS — C50.919 FAMILIAL CANCER OF BREAST, UNSPECIFIED LATERALITY (HCC): ICD-10-CM

## 2020-09-09 DIAGNOSIS — Z12.31 ENCOUNTER FOR SCREENING MAMMOGRAM FOR MALIGNANT NEOPLASM OF BREAST: ICD-10-CM

## 2020-09-09 DIAGNOSIS — E78.00 HYPERCHOLESTEROLEMIA: ICD-10-CM

## 2020-09-09 NOTE — TELEPHONE ENCOUNTER
Patient is requesting an order for annual blood work and annual mammogram. Patient has a physical scheduled with Dr. Sonia Paez on 10/24. Patient can go to an [facility]. Please advise.

## 2020-09-16 NOTE — TELEPHONE ENCOUNTER
LM on  for patient notifying mammogram and labs ordered. Patient to call central scheduling at [phone] to schedule an appointment. Patient to call back with any further questions.

## 2020-09-16 NOTE — TELEPHONE ENCOUNTER
Labs ordered. Mammo ordered. Please notify ptAlise Landers. Colin Hernández MD  Diplomate, American Board of Internal Medicine  Member, [employer] of [address] [facility] [address] [facility]  [address]  [address] [facility],[address], [address], [facility], [address] [place] [address]  T: [phone];

## 2020-10-02 ENCOUNTER — HOSPITAL ENCOUNTER (OUTPATIENT)
Dept: MAMMOGRAPHY | Facility: HOSPITAL | Age: 60
Discharge: HOME | End: 2020-10-02
Attending: INTERNAL MEDICINE
Payer: COMMERCIAL

## 2020-10-02 DIAGNOSIS — C50.919 FAMILIAL CANCER OF BREAST, UNSPECIFIED LATERALITY (HCC): ICD-10-CM

## 2020-10-02 DIAGNOSIS — Z12.31 ENCOUNTER FOR SCREENING MAMMOGRAM FOR MALIGNANT NEOPLASM OF BREAST: ICD-10-CM

## 2020-10-02 PROCEDURE — 77067 SCR MAMMO BI INCL CAD: CPT | Performed by: INTERNAL MEDICINE

## 2020-10-02 PROCEDURE — 77063 BREAST TOMOSYNTHESIS BI: CPT | Performed by: INTERNAL MEDICINE

## 2020-10-07 ENCOUNTER — HOSPITAL ENCOUNTER (OUTPATIENT)
Dept: MAMMOGRAPHY | Facility: HOSPITAL | Age: 60
Discharge: HOME | End: 2020-10-07
Attending: INTERNAL MEDICINE
Payer: COMMERCIAL

## 2020-10-07 DIAGNOSIS — R92.2 INCONCLUSIVE MAMMOGRAM: ICD-10-CM

## 2020-10-07 PROCEDURE — 77065 DX MAMMO INCL CAD UNI: CPT | Performed by: INTERNAL MEDICINE

## 2020-10-07 PROCEDURE — 77061 BREAST TOMOSYNTHESIS UNI: CPT | Performed by: INTERNAL MEDICINE

## 2020-10-07 PROCEDURE — 76642 ULTRASOUND BREAST LIMITED: CPT | Performed by: INTERNAL MEDICINE

## 2020-10-24 ENCOUNTER — OFFICE VISIT (OUTPATIENT)
Dept: INTERNAL MEDICINE CLINIC | Facility: CLINIC | Age: 60
End: 2020-10-24
Payer: COMMERCIAL

## 2020-10-24 VITALS
WEIGHT: 140.63 LBS | HEART RATE: 64 BPM | TEMPERATURE: 98 F | DIASTOLIC BLOOD PRESSURE: 60 MMHG | RESPIRATION RATE: 16 BRPM | HEIGHT: 64.75 IN | SYSTOLIC BLOOD PRESSURE: 106 MMHG | BODY MASS INDEX: 23.72 KG/M2

## 2020-10-24 DIAGNOSIS — Z23 NEED FOR SHINGLES VACCINE: ICD-10-CM

## 2020-10-24 DIAGNOSIS — E78.00 HYPERCHOLESTEROLEMIA: ICD-10-CM

## 2020-10-24 DIAGNOSIS — Z00.00 ROUTINE GENERAL MEDICAL EXAMINATION AT A HEALTH CARE FACILITY: Primary | ICD-10-CM

## 2020-10-24 DIAGNOSIS — Z13.6 SCREENING FOR HEART DISEASE: ICD-10-CM

## 2020-10-24 PROCEDURE — 3074F SYST BP LT 130 MM HG: CPT | Performed by: INTERNAL MEDICINE

## 2020-10-24 PROCEDURE — 82306 VITAMIN D 25 HYDROXY: CPT | Performed by: INTERNAL MEDICINE

## 2020-10-24 PROCEDURE — 85025 COMPLETE CBC W/AUTO DIFF WBC: CPT | Performed by: INTERNAL MEDICINE

## 2020-10-24 PROCEDURE — 99396 PREV VISIT EST AGE 40-64: CPT | Performed by: INTERNAL MEDICINE

## 2020-10-24 PROCEDURE — 83036 HEMOGLOBIN GLYCOSYLATED A1C: CPT | Performed by: INTERNAL MEDICINE

## 2020-10-24 PROCEDURE — 80061 LIPID PANEL: CPT | Performed by: INTERNAL MEDICINE

## 2020-10-24 PROCEDURE — 90471 IMMUNIZATION ADMIN: CPT | Performed by: INTERNAL MEDICINE

## 2020-10-24 PROCEDURE — 3078F DIAST BP <80 MM HG: CPT | Performed by: INTERNAL MEDICINE

## 2020-10-24 PROCEDURE — 81003 URINALYSIS AUTO W/O SCOPE: CPT | Performed by: INTERNAL MEDICINE

## 2020-10-24 PROCEDURE — 3008F BODY MASS INDEX DOCD: CPT | Performed by: INTERNAL MEDICINE

## 2020-10-24 PROCEDURE — 80053 COMPREHEN METABOLIC PANEL: CPT | Performed by: INTERNAL MEDICINE

## 2020-10-24 PROCEDURE — 90750 HZV VACC RECOMBINANT IM: CPT | Performed by: INTERNAL MEDICINE

## 2020-10-24 PROCEDURE — 84443 ASSAY THYROID STIM HORMONE: CPT | Performed by: INTERNAL MEDICINE

## 2020-10-24 RX ORDER — FLUTICASONE PROPIONATE 50 MCG
1 SPRAY, SUSPENSION (ML) NASAL DAILY
Qty: 1 BOTTLE | Refills: 2 | Status: SHIPPED | OUTPATIENT
Start: 2020-10-24 | End: 2021-10-25

## 2020-10-24 RX ORDER — CITALOPRAM 40 MG/1
TABLET ORAL
Qty: 90 TABLET | Refills: 3 | Status: SHIPPED | OUTPATIENT
Start: 2020-10-24 | End: 2021-10-25

## 2020-10-24 NOTE — PATIENT INSTRUCTIONS
Dear Carlos Robins,    Thank you for expressing interest in learning more about my new medical practice.  Your choice to be a member of my [facility]-affiliated practice, in partnership with [facility], is an investment in your health and well-being that can

## 2020-10-24 NOTE — PROGRESS NOTES
Patient presents with:  CPX      HPI: Yanet Alfonso presents today for annual CPX. Stable health. Due for wellness labs. Health goals continue to center around longevity, vitality, and QOL. Review of Systems   All other systems reviewed and are negative.     Isabella times daily. , Disp: 1 Bottle, Rfl: 1  •  prednisoLONE acetate 1 % Ophthalmic Suspension, Place 1 drop into the left eye 4 (four) times daily. , Disp: 1 Bottle, Rfl: 1  •  Fluocinonide 0.1 % External Cream, Apply to affected area twice daily (Patient not be ND  Ext - no c/c/e  Neuro - CNs 2-12 grossly intact, no focal deficits; 2+ DTRs  Psych - nl mood/affect      A/P:  Routine general medical examination at a health care facility  (primary encounter diagnosis)  Need for shingles vaccine  Screening for heart

## 2020-11-23 ENCOUNTER — HOSPITAL ENCOUNTER (OUTPATIENT)
Dept: CT IMAGING | Facility: HOSPITAL | Age: 60
Discharge: HOME | End: 2020-11-23
Attending: INTERNAL MEDICINE

## 2020-11-23 ENCOUNTER — HOSPITAL ENCOUNTER (OUTPATIENT)
Dept: CARDIOLOGY CLINIC | Facility: HOSPITAL | Age: 60
Discharge: HOME | End: 2020-11-23
Attending: INTERNAL MEDICINE

## 2020-11-23 DIAGNOSIS — Z13.6 SCREENING FOR CARDIOVASCULAR CONDITION: ICD-10-CM

## 2020-11-23 DIAGNOSIS — Z13.9 ENCOUNTER FOR SCREENING: ICD-10-CM

## 2020-12-14 ENCOUNTER — TELEPHONE (OUTPATIENT)
Dept: INTERNAL MEDICINE CLINIC | Facility: CLINIC | Age: 60
End: 2020-12-14

## 2020-12-17 ENCOUNTER — IMMUNIZATION (OUTPATIENT)
Dept: LAB | Facility: HOSPITAL | Age: 60
End: 2020-12-17
Attending: PREVENTIVE MEDICINE
Payer: COMMERCIAL

## 2020-12-17 DIAGNOSIS — Z23 NEED FOR VACCINATION: ICD-10-CM

## 2020-12-17 PROCEDURE — 0001A PFIZER-BIONTECH COVID-19 VACCINE: CPT

## 2021-01-07 ENCOUNTER — IMMUNIZATION (OUTPATIENT)
Dept: LAB | Facility: HOSPITAL | Age: 61
End: 2021-01-07
Attending: PREVENTIVE MEDICINE

## 2021-01-07 DIAGNOSIS — Z23 NEED FOR VACCINATION: ICD-10-CM

## 2021-01-07 PROCEDURE — 0002A SARSCOV2 VAC 30MCG/0.3ML IM: CPT

## 2021-02-03 ENCOUNTER — NURSE ONLY (OUTPATIENT)
Dept: INTERNAL MEDICINE CLINIC | Facility: CLINIC | Age: 61
End: 2021-02-03
Payer: COMMERCIAL

## 2021-02-03 PROCEDURE — 90471 IMMUNIZATION ADMIN: CPT | Performed by: INTERNAL MEDICINE

## 2021-02-03 PROCEDURE — 90750 HZV VACC RECOMBINANT IM: CPT | Performed by: INTERNAL MEDICINE

## 2021-08-09 ENCOUNTER — HOSPITAL ENCOUNTER (OUTPATIENT)
Dept: GENERAL RADIOLOGY | Facility: HOSPITAL | Age: 61
Discharge: HOME | End: 2021-08-09
Attending: PREVENTIVE MEDICINE

## 2021-08-09 ENCOUNTER — OFFICE VISIT (OUTPATIENT)
Dept: OTHER | Facility: HOSPITAL | Age: 61
End: 2021-08-09
Attending: PREVENTIVE MEDICINE

## 2021-08-09 DIAGNOSIS — S40.011A CONTUSION OF RIGHT SHOULDER, INITIAL ENCOUNTER: ICD-10-CM

## 2021-08-09 DIAGNOSIS — M25.511 ACUTE PAIN OF RIGHT SHOULDER: ICD-10-CM

## 2021-08-09 DIAGNOSIS — M25.511 ACUTE PAIN OF RIGHT SHOULDER: Primary | ICD-10-CM

## 2021-08-09 PROCEDURE — 73030 X-RAY EXAM OF SHOULDER: CPT | Performed by: PREVENTIVE MEDICINE

## 2021-08-09 PROCEDURE — 73010 X-RAY EXAM OF SHOULDER BLADE: CPT | Performed by: PREVENTIVE MEDICINE

## 2021-08-11 ENCOUNTER — APPOINTMENT (OUTPATIENT)
Dept: OTHER | Facility: HOSPITAL | Age: 61
End: 2021-08-11
Attending: PREVENTIVE MEDICINE

## 2021-09-08 ENCOUNTER — TELEPHONE (OUTPATIENT)
Dept: INTERNAL MEDICINE CLINIC | Facility: CLINIC | Age: 61
End: 2021-09-08

## 2021-09-08 DIAGNOSIS — Z12.31 ENCOUNTER FOR SCREENING MAMMOGRAM FOR MALIGNANT NEOPLASM OF BREAST: Primary | ICD-10-CM

## 2021-09-08 NOTE — TELEPHONE ENCOUNTER
Pt has not established care with you, but has you listed as her PCP. Mammogram orders pending if appropriate. TY!

## 2021-09-08 NOTE — TELEPHONE ENCOUNTER
Pt requesting an order for a mammogram     Please advise     Future Appointments   Date Time Provider Marisol Trinidad   10/25/2021  9:30 AM Robinson Jackson MD [facility] [place]

## 2021-09-09 ENCOUNTER — LAB ENCOUNTER (OUTPATIENT)
Dept: LAB | Age: 61
End: 2021-09-09
Attending: PREVENTIVE MEDICINE

## 2021-09-09 ENCOUNTER — TELEPHONE (OUTPATIENT)
Dept: INTERNAL MEDICINE CLINIC | Facility: HOSPITAL | Age: 61
End: 2021-09-09

## 2021-09-09 DIAGNOSIS — Z20.822 SUSPECTED COVID-19 VIRUS INFECTION: Primary | ICD-10-CM

## 2021-09-09 DIAGNOSIS — Z20.822 SUSPECTED COVID-19 VIRUS INFECTION: ICD-10-CM

## 2021-09-09 LAB — SARS-COV-2 RNA RESP QL NAA+PROBE: NOT DETECTED

## 2021-09-09 NOTE — TELEPHONE ENCOUNTER
Results and RTW guidelines:    COVID RESULT:    [x] Viewed by employee in [address] RTW plan and instructions as indicated on triage call. Manager notified. Estimated RTW date:   [] Discussed with employee   [] Unable to reach by phone.   Sent via [employer]

## 2021-09-09 NOTE — TELEPHONE ENCOUNTER
Department: Surgical Tech                                [x] [address] [facility]  []LIT   [] [address] [facility]    Dept Manager/Supervisor/team or clinical lead:  Radha Zacarias    Position:  [] MD     [] RN     [] Respiratory Therapist     [] PCT     [] PSR      [x] Other [employer] next scheduled to work? 9/15/2021    Did you have close contact with someone on your unit while not wearing a mask? (e.g., during meal breaks):  Yes []   No [x]    If yes, who:   Do you share a workspace? Yes [x]   No []       If yes, with whom?   clean  Do COVID-19 testing ordered: [x] Rapid    [] [facility]    Date test is to be taken:    9/9/2021    []  Outside testing       [x] Manager notified    INSTRUCTIONS PROVIDED:    []Employee was instructed to call Central scheduling at [phone] or use

## 2021-10-25 ENCOUNTER — LAB ENCOUNTER (OUTPATIENT)
Dept: LAB | Age: 61
End: 2021-10-25
Attending: INTERNAL MEDICINE
Payer: COMMERCIAL

## 2021-10-25 ENCOUNTER — HOSPITAL ENCOUNTER (OUTPATIENT)
Dept: MAMMOGRAPHY | Age: 61
Discharge: HOME | End: 2021-10-25
Attending: INTERNAL MEDICINE
Payer: COMMERCIAL

## 2021-10-25 ENCOUNTER — OFFICE VISIT (OUTPATIENT)
Dept: INTERNAL MEDICINE CLINIC | Facility: CLINIC | Age: 61
End: 2021-10-25
Payer: COMMERCIAL

## 2021-10-25 VITALS
WEIGHT: 142 LBS | RESPIRATION RATE: 12 BRPM | HEIGHT: 64.75 IN | HEART RATE: 60 BPM | DIASTOLIC BLOOD PRESSURE: 60 MMHG | OXYGEN SATURATION: 99 % | SYSTOLIC BLOOD PRESSURE: 100 MMHG | BODY MASS INDEX: 23.95 KG/M2 | TEMPERATURE: 98 F

## 2021-10-25 DIAGNOSIS — Z00.00 ROUTINE GENERAL MEDICAL EXAMINATION AT A HEALTH CARE FACILITY: ICD-10-CM

## 2021-10-25 DIAGNOSIS — Z00.00 ROUTINE GENERAL MEDICAL EXAMINATION AT A HEALTH CARE FACILITY: Primary | ICD-10-CM

## 2021-10-25 DIAGNOSIS — Z12.31 ENCOUNTER FOR SCREENING MAMMOGRAM FOR MALIGNANT NEOPLASM OF BREAST: ICD-10-CM

## 2021-10-25 PROCEDURE — 83036 HEMOGLOBIN GLYCOSYLATED A1C: CPT

## 2021-10-25 PROCEDURE — 36415 COLL VENOUS BLD VENIPUNCTURE: CPT

## 2021-10-25 PROCEDURE — 99396 PREV VISIT EST AGE 40-64: CPT | Performed by: INTERNAL MEDICINE

## 2021-10-25 PROCEDURE — 90471 IMMUNIZATION ADMIN: CPT | Performed by: INTERNAL MEDICINE

## 2021-10-25 PROCEDURE — 77063 BREAST TOMOSYNTHESIS BI: CPT | Performed by: INTERNAL MEDICINE

## 2021-10-25 PROCEDURE — 3078F DIAST BP <80 MM HG: CPT | Performed by: INTERNAL MEDICINE

## 2021-10-25 PROCEDURE — 90715 TDAP VACCINE 7 YRS/> IM: CPT | Performed by: INTERNAL MEDICINE

## 2021-10-25 PROCEDURE — 84439 ASSAY OF FREE THYROXINE: CPT

## 2021-10-25 PROCEDURE — 3008F BODY MASS INDEX DOCD: CPT | Performed by: INTERNAL MEDICINE

## 2021-10-25 PROCEDURE — 86803 HEPATITIS C AB TEST: CPT

## 2021-10-25 PROCEDURE — 84450 TRANSFERASE (AST) (SGOT): CPT

## 2021-10-25 PROCEDURE — 80048 BASIC METABOLIC PNL TOTAL CA: CPT

## 2021-10-25 PROCEDURE — 86304 IMMUNOASSAY TUMOR CA 125: CPT

## 2021-10-25 PROCEDURE — 84443 ASSAY THYROID STIM HORMONE: CPT

## 2021-10-25 PROCEDURE — 84460 ALANINE AMINO (ALT) (SGPT): CPT

## 2021-10-25 PROCEDURE — 77067 SCR MAMMO BI INCL CAD: CPT | Performed by: INTERNAL MEDICINE

## 2021-10-25 PROCEDURE — 3074F SYST BP LT 130 MM HG: CPT | Performed by: INTERNAL MEDICINE

## 2021-10-25 PROCEDURE — 80061 LIPID PANEL: CPT

## 2021-10-25 RX ORDER — BUSPIRONE HYDROCHLORIDE 15 MG/1
15 TABLET ORAL 2 TIMES DAILY
Qty: 60 TABLET | Refills: 1 | Status: SHIPPED | OUTPATIENT
Start: 2021-10-25

## 2021-10-25 RX ORDER — FLUTICASONE PROPIONATE 50 MCG
1 SPRAY, SUSPENSION (ML) NASAL DAILY
Qty: 16 G | Refills: 1 | Status: SHIPPED | OUTPATIENT
Start: 2021-10-25

## 2021-10-25 RX ORDER — CITALOPRAM 40 MG/1
40 TABLET ORAL DAILY
COMMUNITY

## 2021-10-25 RX ORDER — CITALOPRAM 40 MG/1
TABLET ORAL
Qty: 90 TABLET | Refills: 3 | Status: SHIPPED | OUTPATIENT
Start: 2021-10-25

## 2021-10-25 NOTE — PROGRESS NOTES
Vahid Sears is a 63year old female. HPI:   Patient presents for a physical exam. She is new. 1. Pre-diabetes: She eats little meat. However, she is \"addicted to sugar. \" she east a lot fruit. Lots of halloween candy at work right now.    2. Dep Chronic rhinitis    • Depression    • Routine general medical examination at a [facility] facility 5/9/2011      Past Surgical History:   Procedure Laterality Date   • COLONOSCOPY  12/6/2013    Procedure: COLONOSCOPY;  Surgeon: Lilian Hines MD;  James Perez Screening: neg cytology and HPV 10/2019.  Repeat in 5 years (2024)  Breast Cancer Screening: cont yearly mammogram   Bone Health/Fall Prevention (Y[address]ie 73): educated on dietary calcium/vit D3, weight bearing exercises and fall prevention  Vaccines: up to christine

## 2022-04-22 ENCOUNTER — HOSPITAL ENCOUNTER (OUTPATIENT)
Dept: MRI IMAGING | Facility: HOSPITAL | Age: 62
Discharge: HOME | End: 2022-04-22
Attending: ORTHOPAEDIC SURGERY
Payer: COMMERCIAL

## 2022-04-22 DIAGNOSIS — S46.011A TRAUMATIC COMPLETE TEAR OF RIGHT ROTATOR CUFF, INITIAL ENCOUNTER: ICD-10-CM

## 2022-04-22 PROCEDURE — 73221 MRI JOINT UPR EXTREM W/O DYE: CPT | Performed by: ORTHOPAEDIC SURGERY

## 2022-05-03 RX ORDER — FLUTICASONE PROPIONATE 50 MCG
1 SPRAY, SUSPENSION (ML) NASAL DAILY
Qty: 16 G | Refills: 1 | Status: SHIPPED | OUTPATIENT
Start: 2022-05-03 | End: 2022-11-12

## 2022-06-07 ENCOUNTER — ORDER TRANSCRIPTION (OUTPATIENT)
Dept: PHYSICAL THERAPY | Facility: HOSPITAL | Age: 62
End: 2022-06-07

## 2022-06-07 DIAGNOSIS — Z47.89 ORTHOPEDIC AFTERCARE: Primary | ICD-10-CM

## 2022-06-16 ENCOUNTER — TELEPHONE (OUTPATIENT)
Dept: PHYSICAL THERAPY | Facility: HOSPITAL | Age: 62
End: 2022-06-16

## 2022-06-20 ENCOUNTER — OFFICE VISIT (OUTPATIENT)
Dept: PHYSICAL THERAPY | Age: 62
End: 2022-06-20
Attending: PHYSICIAN ASSISTANT
Payer: COMMERCIAL

## 2022-06-20 PROCEDURE — 97161 PT EVAL LOW COMPLEX 20 MIN: CPT | Performed by: PHYSICAL THERAPIST

## 2022-06-20 PROCEDURE — 97110 THERAPEUTIC EXERCISES: CPT | Performed by: PHYSICAL THERAPIST

## 2022-06-22 ENCOUNTER — OFFICE VISIT (OUTPATIENT)
Dept: PHYSICAL THERAPY | Age: 62
End: 2022-06-22
Attending: PHYSICIAN ASSISTANT
Payer: COMMERCIAL

## 2022-06-22 PROCEDURE — 97110 THERAPEUTIC EXERCISES: CPT

## 2022-06-22 NOTE — PROGRESS NOTES
Dx: S/P RIght RCR, Biceps tenodesis and acromioplasty           Authorized # of Visits:  20  Fall Risk: standard         Precautions: n/a             Subjective: The patient reports her shoulder has been feeling pretty good without significant complaints of pain  Current Pain Ratin/10  Objective:   See flow sheet    Assessment:   The patient tolerated treatment well with no complaints of shoulder pain and improving shoulder PROM    Plan:   Progress per protocol    Date: 2022  Tx#:  Date: Tx#: 3/ Date: Tx#: 4/ Date: Tx#: 5/ Date: Tx#: 6/ Date: Tx#: 7/ Date: Tx#: 8/   TherEx TherEx TherEx TherEx TherEx TherEx TherEx   Supine PROM right shoulder and elbow as tolerated         Pendulums x 2 min         Elbow AROM flexion/extension         Reviewed HEP         -         -         -         -         -         HEP: Pendulums    Charges:  TherEx x 2       Total Timed Treatment: 30 min  Total Treatment Time: 30 min

## 2022-06-27 ENCOUNTER — APPOINTMENT (OUTPATIENT)
Dept: PHYSICAL THERAPY | Age: 62
End: 2022-06-27
Attending: PHYSICIAN ASSISTANT
Payer: COMMERCIAL

## 2022-06-27 ENCOUNTER — TELEPHONE (OUTPATIENT)
Dept: PHYSICAL THERAPY | Facility: HOSPITAL | Age: 62
End: 2022-06-27

## 2022-06-28 ENCOUNTER — APPOINTMENT (OUTPATIENT)
Dept: PHYSICAL THERAPY | Age: 62
End: 2022-06-28
Attending: PHYSICIAN ASSISTANT
Payer: COMMERCIAL

## 2022-06-29 ENCOUNTER — TELEPHONE (OUTPATIENT)
Dept: PHYSICAL THERAPY | Facility: HOSPITAL | Age: 62
End: 2022-06-29

## 2022-06-30 ENCOUNTER — APPOINTMENT (OUTPATIENT)
Dept: PHYSICAL THERAPY | Age: 62
End: 2022-06-30
Attending: PHYSICIAN ASSISTANT
Payer: COMMERCIAL

## 2022-07-06 ENCOUNTER — OFFICE VISIT (OUTPATIENT)
Dept: PHYSICAL THERAPY | Age: 62
End: 2022-07-06
Attending: PHYSICIAN ASSISTANT
Payer: COMMERCIAL

## 2022-07-06 DIAGNOSIS — Z47.89 ORTHOPEDIC AFTERCARE: ICD-10-CM

## 2022-07-06 PROCEDURE — 97110 THERAPEUTIC EXERCISES: CPT | Performed by: PHYSICAL THERAPIST

## 2022-07-06 NOTE — PROGRESS NOTES
Dx: S/P RIght RCR, Biceps tenodesis and acromioplasty           Authorized # of Visits:  20  Fall Risk: standard         Precautions: n/a          Subjective:   Patient states no pain, there is some discomfort with lifting her arm out to the side  Current Pain Ratin/10  Objective:   Right shoulder PROM WNL - no pain. Active abduction in scapular plane, mild discomfort and shoulder hike is noted  HEP additions as noted    Assessment:   There was some shoulder discomfort and fatigue after the session. Decreased abductor strength is the primary problem. Good tolerance for isometrics. Patient demonstrated proper performance of HEP    Plan:   Progressive strengthening as tolerated     Date: 2022  Tx#:  Date: 2022  Tx#: 3/20 Date: Tx#: 4/ Date: Tx#: 5/ Date: Tx#: 6/ Date: Tx#: 7/ Date: Tx#: 8/   TherEx TherEx TherEx TherEx TherEx TherEx TherEx   Supine PROM right shoulder and elbow as tolerated UBE 6 minutes         Pendulums x 2 min Prone shoulder extension 10 reps x 3, 3 second hold (HEP)        Elbow AROM flexion/extension Prone MT 10 reps x 3, 3 second hold (HEP)        Reviewed HEP 45 degree abduction IR/ER isometrics 10 reps x 3, 3 second hold         - Bicep isometrics 10 reps x 3, 3 second hold         - scap plane abduction with visual feedback below shoulder hike ROM approx 10 reps x 2, 3 second -  hold (HEP)        - \"V\" wall wash with pillowcase (HEP)        -         -             Charges:  TherEx x 3       Total Timed Treatment: 45 min  Total Treatment Time: 45 min

## 2022-07-08 ENCOUNTER — OFFICE VISIT (OUTPATIENT)
Dept: PHYSICAL THERAPY | Age: 62
End: 2022-07-08
Attending: PHYSICIAN ASSISTANT
Payer: COMMERCIAL

## 2022-07-08 PROCEDURE — 97110 THERAPEUTIC EXERCISES: CPT

## 2022-07-08 NOTE — PROGRESS NOTES
Dx: S/P RIght RCR, Biceps tenodesis and acromioplasty           Authorized # of Visits:  20  Fall Risk: standard         Precautions: n/a          Subjective: The patient reports she's been working on exercises at home and still has trouble raising the arm out to the side  Current Pain Ratin/10  Objective:   See flow sheet    Assessment:   The patient tolerated treatment well with continued difficulty with active shoulder flexion and abduction without shoulder hiking    Plan:   Progressive strengthening as tolerated     Date: 2022  Tx#:  Date: 2022  Tx#: 3/20 Date:   2022  Tx#:  Date: Tx#: 5/ Date: Tx#: / Date: Tx#: / Date: Tx#: 8/   TherEx TherEx TherEx TherEx TherEx TherEx TherEx   Supine PROM right shoulder and elbow as tolerated UBE 6 minutes  UBE x 6 min       Pendulums x 2 min Prone shoulder extension 10 reps x 3, 3 second hold (HEP) Supine active shoulder flexion x 15       Elbow AROM flexion/extension Prone MT 10 reps x 3, 3 second hold (HEP) S/L shoulder abduction 2 x 15       Reviewed HEP 45 degree abduction IR/ER isometrics 10 reps x 3, 3 second hold  S/L shoulder flexion x 15       - Bicep isometrics 10 reps x 3, 3 second hold  S/L shoulder ER       - scap plane abduction with visual feedback below shoulder hike ROM approx 10 reps x 2, 3 second -  hold (HEP) Seated OH pulleys shoulder flexion and horizontal abd with ER       - \"V\" wall wash with pillowcase (HEP) Supine rhythmic stabilization at 90 degrees x 2       -  Prone I and T 2 x to fatigue with 5 sec holds       -  Reviewed HEP           Charges:  TherEx x 3       Total Timed Treatment: 40 min  Total Treatment Time: 40 min

## 2022-07-11 ENCOUNTER — OFFICE VISIT (OUTPATIENT)
Dept: PHYSICAL THERAPY | Age: 62
End: 2022-07-11
Attending: PHYSICIAN ASSISTANT
Payer: COMMERCIAL

## 2022-07-11 PROCEDURE — 97110 THERAPEUTIC EXERCISES: CPT | Performed by: PHYSICAL THERAPIST

## 2022-07-11 NOTE — PROGRESS NOTES
Dx: S/P RIght RCR, Biceps tenodesis and acromioplasty           Authorized # of Visits:  20  Fall Risk: standard         Precautions: n/a          Subjective:   Lifting arm without hiking her shoulder is the biggest problem  Current Pain Ratin/10  Objective:   PROM right shoulder WNL - no pain. Right shoulder AROM flex/abduction > 90 mild shoulder hiking with some discomfort. No pain with WB through right UE   HEP additions as noted. Assessment:   Patient tolerated the session well. Shoulder hiking is related to hiking versus decreased ROM. She is highly motivated with exercise. Plan:   Progressive strengthening as tolerated     Date: 2022  Tx#:  Date: 2022  Tx#: 3/20 Date:   2022  Tx#:  Date: 2022  Tx#: / Date: Tx#: / Date: Tx#: / Date:    Tx#: 8/   TherEx TherEx TherEx TherEx TherEx TherEx TherEx   Supine PROM right shoulder and elbow as tolerated UBE 6 minutes  UBE x 6 min UBE 6 minutes       Pendulums x 2 min Prone shoulder extension 10 reps x 3, 3 second hold (HEP) Supine active shoulder flexion x 15 Base position right shoulder IR/ER manual resisted       Elbow AROM flexion/extension Prone MT 10 reps x 3, 3 second hold (HEP) S/L shoulder abduction 2 x 15 Base position ER with yellow band 10 reps x 2 (HEP)      Reviewed HEP 45 degree abduction IR/ER isometrics 10 reps x 3, 3 second hold  S/L shoulder flexion x 15 Base position right shoulder IR with yellow band 10 reps x 2 (HEP)      - Bicep isometrics 10 reps x 3, 3 second hold  S/L shoulder ER scap retraction with green band > 15 reps  (HEP)      - scap plane abduction with visual feedback below shoulder hike ROM approx 10 reps x 2, 3 second -  hold (HEP) Seated OH pulleys shoulder flexion and horizontal abd with ER Triceps with yellow band (HEP) patient may utilize green band for HEP      - \"V\" wall wash with pillowcase (HEP) Supine rhythmic stabilization at 90 degrees x 2 Supine right shoulder at 90 rhythmic stabilization 20 reps each       -  Prone I and T 2 x to fatigue with 5 sec holds Wall press alternating arms       -  Reviewed HEP Quadruped - no pain WB with opposite arm lift, no pain, no pain with standing forward bending hands on floor WB           Charges:  TherEx x 3       Total Timed Treatment: 45 min  Total Treatment Time: 45 min

## 2022-07-13 ENCOUNTER — OFFICE VISIT (OUTPATIENT)
Dept: PHYSICAL THERAPY | Age: 62
End: 2022-07-13
Attending: PHYSICIAN ASSISTANT
Payer: COMMERCIAL

## 2022-07-13 PROCEDURE — 97110 THERAPEUTIC EXERCISES: CPT | Performed by: PHYSICAL THERAPIST

## 2022-07-13 NOTE — PROGRESS NOTES
Dx: S/P RIght RCR, Biceps tenodesis and acromioplasty           Authorized # of Visits:  20  Fall Risk: standard         Precautions: n/a          Subjective:   Patient states the wall exercise has helped improve the shoulder hiking  Current Pain Ratin/10  Objective:   Initiated bicep exercise with no pain. Right shoulder abd/IR/ER strength remain decreased     Assessment:   Patient tolerated exercise. ROM is well WNL. She is highly motivated to progress with strengthening. Plan:   Progressive strengthening as tolerated     Date: 2022  Tx#:  Date: 2022  Tx#: 3/20 Date:   2022  Tx#:  Date: 2022  Tx#: / Date: 2022  Tx#:  Date: Tx#: 7/ Date:    Tx#: 8/   TherEx TherEx TherEx TherEx TherEx TherEx TherEx   Supine PROM right shoulder and elbow as tolerated UBE 6 minutes  UBE x 6 min UBE 6 minutes  UBE 6 minutes      Pendulums x 2 min Prone shoulder extension 10 reps x 3, 3 second hold (HEP) Supine active shoulder flexion x 15 Base position right shoulder IR/ER manual resisted  Right shoulder PROM     Elbow AROM flexion/extension Prone MT 10 reps x 3, 3 second hold (HEP) S/L shoulder abduction 2 x 15 Base position ER with yellow band 10 reps x 2 (HEP) SL right shoulder ER 1 pound 10 reps x 1       Reviewed HEP 45 degree abduction IR/ER isometrics 10 reps x 3, 3 second hold  S/L shoulder flexion x 15 Base position right shoulder IR with yellow band 10 reps x 2 (HEP) Rhythmic stabilization at 90 in SL and supine > 10 reps x 2 each      - Bicep isometrics 10 reps x 3, 3 second hold  S/L shoulder ER scap retraction with green band > 15 reps  (HEP) Body blade at side 15 seconds x 5, bilat hold shoulder height 15 seconds x 5      - scap plane abduction with visual feedback below shoulder hike ROM approx 10 reps x 2, 3 second -  hold (HEP) Seated OH pulleys shoulder flexion and horizontal abd with ER Triceps with yellow band (HEP) patient may utilize green band for HEP Bilateral pulldown 48 pounds 10 reps x 3  Bicep curl 9 pound bar 10 reps x 3      - \"V\" wall wash with pillowcase (HEP) Supine rhythmic stabilization at 90 degrees x 2 Supine right shoulder at 90 rhythmic stabilization 20 reps each  High cable pull 36 pounds 10 reps x 3      -  Prone I and T 2 x to fatigue with 5 sec holds Wall press alternating arms  Cable IR one plate 10 reps x 2     -  Reviewed HEP Quadruped - no pain WB with opposite arm lift, no pain, no pain with standing forward bending hands on floor WB  Prone shoulder extension 10 reps x 2          Prone MT 10 reps x 2          Charges:  TherEx x 3       Total Timed Treatment: 45 min  Total Treatment Time: 45 min

## 2022-07-18 ENCOUNTER — OFFICE VISIT (OUTPATIENT)
Dept: PHYSICAL THERAPY | Age: 62
End: 2022-07-18
Attending: PHYSICIAN ASSISTANT
Payer: COMMERCIAL

## 2022-07-18 PROCEDURE — 97110 THERAPEUTIC EXERCISES: CPT | Performed by: PHYSICAL THERAPIST

## 2022-07-18 NOTE — PROGRESS NOTES
Dx: S/P RIght RCR, Biceps tenodesis and acromioplasty           Authorized # of Visits:  20  Fall Risk: standard         Precautions: n/a          Subjective:   Patient states her arm is still very weak attempting to lift overhead  Current Pain Ratin-1/10  Objective:   Patient instructed to perform wall slides versus active lift against gravity with no assist. She will perform abduction in SL   No pain with isometrics    Assessment:  Decreased right shoulder strength is the primary problem. Reinforced time as a component of strengthening. Patient better with against gravity exercise with assist.     Plan:   Progressive strengthening as tolerated     Date: 2022  Tx#:  Date: 2022  Tx#: 3/20 Date:   2022  Tx#:  Date: 2022  Tx#: / Date: 2022  Tx#:  Date: 2022  Tx#:  Date:    Tx#: 8/   TherEx TherEx TherEx TherEx TherEx TherEx TherEx   Supine PROM right shoulder and elbow as tolerated UBE 6 minutes  UBE x 6 min UBE 6 minutes  UBE 6 minutes  UBE 8 minutes     Pendulums x 2 min Prone shoulder extension 10 reps x 3, 3 second hold (HEP) Supine active shoulder flexion x 15 Base position right shoulder IR/ER manual resisted  Right shoulder PROM Prone shoulder extension 10 reps x 2 3 second hold    Elbow AROM flexion/extension Prone MT 10 reps x 3, 3 second hold (HEP) S/L shoulder abduction 2 x 15 Base position ER with yellow band 10 reps x 2 (HEP) SL right shoulder ER 1 pound 10 reps x 1   Prone MT > 20 reps 3 second hold     Reviewed HEP 45 degree abduction IR/ER isometrics 10 reps x 3, 3 second hold  S/L shoulder flexion x 15 Base position right shoulder IR with yellow band 10 reps x 2 (HEP) Rhythmic stabilization at 90 in SL and supine > 10 reps x 2 each  SL right shoulder abduction 90 degrees 10 reps x 3     - Bicep isometrics 10 reps x 3, 3 second hold  S/L shoulder ER scap retraction with green band > 15 reps  (HEP) Body blade at side 15 seconds x 5, bilat hold shoulder height 15 seconds x 5  Supine rhythmic stabilization at 90 flexion proximal hand placement > 20 reps each    - scap plane abduction with visual feedback below shoulder hike ROM approx 10 reps x 2, 3 second -  hold (HEP) Seated OH pulleys shoulder flexion and horizontal abd with ER Triceps with yellow band (HEP) patient may utilize green band for HEP Bilateral pulldown 48 pounds 10 reps x 3  Bicep curl 9 pound bar 10 reps x 3  Isometric IR/ER base position 10 reps x 3 with 3-5 second hold    - \"V\" wall wash with pillowcase (HEP) Supine rhythmic stabilization at 90 degrees x 2 Supine right shoulder at 90 rhythmic stabilization 20 reps each  High cable pull 36 pounds 10 reps x 3  Seated isometric abduction/flexion base position > 20 reps each    -  Prone I and T 2 x to fatigue with 5 sec holds Wall press alternating arms  Cable IR one plate 10 reps x 2 Right shoulder wall slides with pillow case    -  Reviewed HEP Quadruped - no pain WB with opposite arm lift, no pain, no pain with standing forward bending hands on floor WB  Prone shoulder extension 10 reps x 2  Body blade 15  Seconds x 5 at side right UE        Prone MT 10 reps x 2          Charges:  TherEx x 3       Total Timed Treatment: 45 min  Total Treatment Time: 45 min

## 2022-07-20 ENCOUNTER — OFFICE VISIT (OUTPATIENT)
Dept: PHYSICAL THERAPY | Age: 62
End: 2022-07-20
Attending: PHYSICIAN ASSISTANT
Payer: COMMERCIAL

## 2022-07-20 PROCEDURE — 97110 THERAPEUTIC EXERCISES: CPT | Performed by: PHYSICAL THERAPIST

## 2022-07-25 ENCOUNTER — APPOINTMENT (OUTPATIENT)
Dept: PHYSICAL THERAPY | Age: 62
End: 2022-07-25
Attending: PHYSICIAN ASSISTANT
Payer: COMMERCIAL

## 2022-07-27 ENCOUNTER — OFFICE VISIT (OUTPATIENT)
Dept: PHYSICAL THERAPY | Age: 62
End: 2022-07-27
Attending: PHYSICIAN ASSISTANT
Payer: COMMERCIAL

## 2022-07-27 PROCEDURE — 97110 THERAPEUTIC EXERCISES: CPT | Performed by: PHYSICAL THERAPIST

## 2022-07-27 NOTE — PROGRESS NOTES
Dx: S/P RIght RCR, Biceps tenodesis and acromioplasty           Authorized # of Visits:  20  Fall Risk: standard         Precautions: n/a         Progress Summary  Pt has attended 9 visits in Physical Therapy. Subjective:   Patient states she is better today. She has not been forcing the overhead motion  Current Pain Ratin/10  Objective:   Right shoulder AROM WNL - no pain. There is mild shoulder hiking primarily with scap plane abduction when moving above 90 degrees. With AAROM - hiking is eliminated   Right UE strength:   Flexion/abduction 3/5, IR  4/5, ER 3+/5, MT 3+/5, LT 3/5. Biceps 5/5. She is able to perform bilateral curl with minimum of 10 pounds with no pain. Suitcase carry 20 pounds. : right = 45 pounds, left = 55 pounds. Assessment:  Patient is making progress with strengthening. Primary problem is rotator cuff weakness. Based on current strength, she is not ready for any overhead lifting. Patient  demonstrates understanding of need for additional time/rotator cuff strength in order to improve overhead reaching/lifting. Plan: Continue skilled Physical Therapy 2 x/week or a total of 8 visits over a 90 day period. Treatment will include: progressive strengthening        Patient/Family/Caregiver was advised of these findings, precautions, and treatment options and has agreed to actively participate in planning and for this course of care. Thank you for your referral. If you have any questions, please contact me at Dept: [phone]. Sincerely,  Electronically signed by therapist: Dianelys Jackson PT     Physician's certification required:  Yes  Please co-sign or sign and return this letter via fax as soon as possible to [phone]. I certify the need for these services furnished under this plan of treatment and while under my care.     X___________________________________________________ Date____________________    Certification From:   To:10/25/2022      Date: 6/22/2022  Tx#: 2/20 Date: 7/6/2022  Tx#: 3/20 Date:   7/8/2022  Tx#: 4/20 Date: 7/11/2022  Tx#: 5/ Date: 7/13/2022  Tx#: 6/20 Date: 7/18/2022  Tx#: 7/20 Date: 7/20/2022  Tx#: 8/20 Date: 7/27/2022  Tx#: 9/20    TherEx TherEx TherEx TherEx TherEx TherEx TherEx TherEx   Supine PROM right shoulder and elbow as tolerated UBE 6 minutes  UBE x 6 min UBE 6 minutes  UBE 6 minutes  UBE 8 minutes  UBE 6 minutes  UBE 6 minutes    Pendulums x 2 min Prone shoulder extension 10 reps x 3, 3 second hold (HEP) Supine active shoulder flexion x 15 Base position right shoulder IR/ER manual resisted  Right shoulder PROM Prone shoulder extension 10 reps x 2 3 second hold Pone shoulder extension 10 reps x 2 3 seconds each  Ball cw/ccw 15 reps each    Elbow AROM flexion/extension Prone MT 10 reps x 3, 3 second hold (HEP) S/L shoulder abduction 2 x 15 Base position ER with yellow band 10 reps x 2 (HEP) SL right shoulder ER 1 pound 10 reps x 1   Prone MT > 20 reps 3 second hold  Prone rhomboids  10 reps x 2 3 second hold vs MT  10 pound medicine ball bicep curl 10 reps x 3    Reviewed HEP 45 degree abduction IR/ER isometrics 10 reps x 3, 3 second hold  S/L shoulder flexion x 15 Base position right shoulder IR with yellow band 10 reps x 2 (HEP) Rhythmic stabilization at 90 in SL and supine > 10 reps x 2 each  SL right shoulder abduction 90 degrees 10 reps x 3  Quadruped review of UE reaches  Base position ER  Yellow band > 15 reps 3 second hold   - Bicep isometrics 10 reps x 3, 3 second hold  S/L shoulder ER scap retraction with green band > 15 reps  (HEP) Body blade at side 15 seconds x 5, bilat hold shoulder height 15 seconds x 5  Supine rhythmic stabilization at 90 flexion proximal hand placement > 20 reps each SL ER 10 reps x 2 each SL slow/reveral at 90 abduction 10 reps x 3    - scap plane abduction with visual feedback below shoulder hike ROM approx 10 reps x 2, 3 second -  hold (HEP) Seated OH pulleys shoulder flexion and horizontal abd with ER Triceps with yellow band (HEP) patient may utilize green band for HEP Bilateral pulldown 48 pounds 10 reps x 3  Bicep curl 9 pound bar 10 reps x 3  Isometric IR/ER base position 10 reps x 3 with 3-5 second hold SL right shoulder abduction to 90 10 x 2         SL right shoulder arm bar rhythmic stabilization, SL 3 pound arm bar rhythmic stabilization > 20 seconds x 3 SL horizontal abduction 10 reps x 2 right UE    SL ER 10 reps x 2    - \"V\" wall wash with pillowcase (HEP) Supine rhythmic stabilization at 90 degrees x 2 Supine right shoulder at 90 rhythmic stabilization 20 reps each  High cable pull 36 pounds 10 reps x 3  Seated isometric abduction/flexion base position > 20 reps each SL ER 10 reps x 2 right UE Right shoulder IR/ER isometrics at 40 abduction 10 reps  X 3    -  Prone I and T 2 x to fatigue with 5 sec holds Wall press alternating arms  Cable IR one plate 10 reps x 2 Right shoulder wall slides with pillow case Seated row cable  72 pounds 10 reps x 3  Supine right shoulder 90 flexion slow reversals 10 reps x 2 each    -  Reviewed HEP Quadruped - no pain WB with opposite arm lift, no pain, no pain with standing forward bending hands on floor WB  Prone shoulder extension 10 reps x 2  Body blade 15  Seconds x 5 at side right UE Isometric IR/ER 30 degrees abduction 10 reps x 3         Prone MT 10 reps x 2   Bicep curl bilat hold 9 pound bar 10 reps x 3         Charges:  TherEx x 3       Total Timed Treatment: 45 min  Total Treatment Time: 45 min

## 2022-08-09 ENCOUNTER — APPOINTMENT (OUTPATIENT)
Dept: PHYSICAL THERAPY | Age: 62
End: 2022-08-09
Payer: COMMERCIAL

## 2022-09-19 ENCOUNTER — PATIENT MESSAGE (OUTPATIENT)
Dept: INTERNAL MEDICINE CLINIC | Facility: CLINIC | Age: 62
End: 2022-09-19

## 2022-09-19 DIAGNOSIS — Z12.31 SCREENING MAMMOGRAM FOR BREAST CANCER: ICD-10-CM

## 2022-09-19 DIAGNOSIS — Z00.00 ROUTINE GENERAL MEDICAL EXAMINATION AT A HEALTH CARE FACILITY: Primary | ICD-10-CM

## 2022-09-20 NOTE — TELEPHONE ENCOUNTER
From: Jerome Mckenzie  To: Alina Byrd MD  Sent: 9/19/2022 4:10 PM CDT  Subject: Bloodwork/mammo order    I have my yearly with Dr Feliciano Dumont on 11/7/2022. Please order my yearly Mammo & bloodwork so I can make an appointment with central scheduleing on that day after my appointment. I will be fasting.   Thank you  Alissa Bland

## 2022-10-28 ENCOUNTER — HOSPITAL ENCOUNTER (OUTPATIENT)
Dept: GENERAL RADIOLOGY | Facility: HOSPITAL | Age: 62
Discharge: HOME | End: 2022-10-28
Attending: PREVENTIVE MEDICINE

## 2022-10-28 ENCOUNTER — OCC HEALTH (OUTPATIENT)
Dept: OTHER | Facility: HOSPITAL | Age: 62
End: 2022-10-28
Attending: PREVENTIVE MEDICINE

## 2022-10-28 DIAGNOSIS — M25.511 ACUTE PAIN OF RIGHT SHOULDER: ICD-10-CM

## 2022-10-28 DIAGNOSIS — M25.511 ACUTE PAIN OF RIGHT SHOULDER: Primary | ICD-10-CM

## 2022-10-28 PROCEDURE — 73030 X-RAY EXAM OF SHOULDER: CPT | Performed by: PREVENTIVE MEDICINE

## 2022-10-31 ENCOUNTER — APPOINTMENT (OUTPATIENT)
Dept: OTHER | Facility: HOSPITAL | Age: 62
End: 2022-10-31
Attending: PREVENTIVE MEDICINE

## 2022-11-07 ENCOUNTER — LAB ENCOUNTER (OUTPATIENT)
Dept: LAB | Age: 62
End: 2022-11-07
Attending: INTERNAL MEDICINE
Payer: COMMERCIAL

## 2022-11-07 ENCOUNTER — HOSPITAL ENCOUNTER (OUTPATIENT)
Dept: MAMMOGRAPHY | Age: 62
Discharge: HOME | End: 2022-11-07
Attending: INTERNAL MEDICINE
Payer: COMMERCIAL

## 2022-11-07 ENCOUNTER — OFFICE VISIT (OUTPATIENT)
Dept: INTERNAL MEDICINE CLINIC | Facility: CLINIC | Age: 62
End: 2022-11-07
Payer: COMMERCIAL

## 2022-11-07 VITALS
OXYGEN SATURATION: 97 % | TEMPERATURE: 98 F | SYSTOLIC BLOOD PRESSURE: 90 MMHG | HEART RATE: 66 BPM | HEIGHT: 64.5 IN | DIASTOLIC BLOOD PRESSURE: 60 MMHG | BODY MASS INDEX: 24.42 KG/M2 | RESPIRATION RATE: 16 BRPM | WEIGHT: 144.81 LBS

## 2022-11-07 DIAGNOSIS — R53.83 OTHER FATIGUE: ICD-10-CM

## 2022-11-07 DIAGNOSIS — Z00.00 ROUTINE GENERAL MEDICAL EXAMINATION AT A HEALTH CARE FACILITY: Primary | ICD-10-CM

## 2022-11-07 DIAGNOSIS — Z00.00 LABORATORY EXAM ORDERED AS PART OF ROUTINE GENERAL MEDICAL EXAMINATION: ICD-10-CM

## 2022-11-07 DIAGNOSIS — Z00.00 ROUTINE GENERAL MEDICAL EXAMINATION AT A HEALTH CARE FACILITY: ICD-10-CM

## 2022-11-07 DIAGNOSIS — F33.0 MILD EPISODE OF RECURRENT MAJOR DEPRESSIVE DISORDER (HCC): ICD-10-CM

## 2022-11-07 DIAGNOSIS — Z12.31 SCREENING MAMMOGRAM FOR BREAST CANCER: ICD-10-CM

## 2022-11-07 PROBLEM — M17.11 OSTEOARTHRITIS OF RIGHT KNEE, UNSPECIFIED OSTEOARTHRITIS TYPE: Status: RESOLVED | Noted: 2018-05-14 | Resolved: 2022-11-07

## 2022-11-07 PROBLEM — M17.12 OSTEOARTHRITIS OF LEFT KNEE, UNSPECIFIED OSTEOARTHRITIS TYPE: Status: RESOLVED | Noted: 2018-05-14 | Resolved: 2022-11-07

## 2022-11-07 PROBLEM — E78.00 HYPERCHOLESTEROLEMIA: Status: RESOLVED | Noted: 2017-02-21 | Resolved: 2022-11-07

## 2022-11-07 LAB
ALT SERPL-CCNC: 27 U/L
ANION GAP SERPL CALC-SCNC: 3 MMOL/L (ref 0–18)
AST SERPL-CCNC: 24 U/L (ref 15–37)
BUN BLD-MCNC: 18 MG/DL (ref 7–18)
CALCIUM BLD-MCNC: 8.9 MG/DL (ref 8.5–10.1)
CHLORIDE SERPL-SCNC: 108 MMOL/L (ref 98–112)
CHOLEST SERPL-MCNC: 214 MG/DL (ref ?–200)
CO2 SERPL-SCNC: 27 MMOL/L (ref 21–32)
CREAT BLD-MCNC: 0.73 MG/DL
EST. AVERAGE GLUCOSE BLD GHB EST-MCNC: 123 MG/DL (ref 68–126)
FASTING PATIENT LIPID ANSWER: YES
FASTING STATUS PATIENT QL REPORTED: YES
GFR SERPLBLD BASED ON 1.73 SQ M-ARVRAT: 94 ML/MIN/1.73M2 (ref 60–?)
GLUCOSE BLD-MCNC: 92 MG/DL (ref 70–99)
HBA1C MFR BLD: 5.9 % (ref ?–5.7)
HDLC SERPL-MCNC: 72 MG/DL (ref 40–59)
LDLC SERPL CALC-MCNC: 128 MG/DL (ref ?–100)
NONHDLC SERPL-MCNC: 142 MG/DL (ref ?–130)
OSMOLALITY SERPL CALC.SUM OF ELEC: 288 MOSM/KG (ref 275–295)
POTASSIUM SERPL-SCNC: 4.7 MMOL/L (ref 3.5–5.1)
SODIUM SERPL-SCNC: 138 MMOL/L (ref 136–145)
T4 FREE SERPL-MCNC: 0.8 NG/DL (ref 0.8–1.7)
TRIGL SERPL-MCNC: 82 MG/DL (ref 30–149)
TSI SER-ACNC: 1.87 MIU/ML (ref 0.36–3.74)
VLDLC SERPL CALC-MCNC: 15 MG/DL (ref 0–30)

## 2022-11-07 PROCEDURE — 82306 VITAMIN D 25 HYDROXY: CPT | Performed by: INTERNAL MEDICINE

## 2022-11-07 PROCEDURE — 82607 VITAMIN B-12: CPT | Performed by: INTERNAL MEDICINE

## 2022-11-07 PROCEDURE — 84460 ALANINE AMINO (ALT) (SGPT): CPT

## 2022-11-07 PROCEDURE — 83550 IRON BINDING TEST: CPT | Performed by: INTERNAL MEDICINE

## 2022-11-07 PROCEDURE — 77067 SCR MAMMO BI INCL CAD: CPT | Performed by: INTERNAL MEDICINE

## 2022-11-07 PROCEDURE — 84443 ASSAY THYROID STIM HORMONE: CPT

## 2022-11-07 PROCEDURE — 3008F BODY MASS INDEX DOCD: CPT | Performed by: INTERNAL MEDICINE

## 2022-11-07 PROCEDURE — 83036 HEMOGLOBIN GLYCOSYLATED A1C: CPT

## 2022-11-07 PROCEDURE — 77063 BREAST TOMOSYNTHESIS BI: CPT | Performed by: INTERNAL MEDICINE

## 2022-11-07 PROCEDURE — 3074F SYST BP LT 130 MM HG: CPT | Performed by: INTERNAL MEDICINE

## 2022-11-07 PROCEDURE — 80048 BASIC METABOLIC PNL TOTAL CA: CPT

## 2022-11-07 PROCEDURE — 3078F DIAST BP <80 MM HG: CPT | Performed by: INTERNAL MEDICINE

## 2022-11-07 PROCEDURE — 82728 ASSAY OF FERRITIN: CPT

## 2022-11-07 PROCEDURE — 84439 ASSAY OF FREE THYROXINE: CPT

## 2022-11-07 PROCEDURE — 83540 ASSAY OF IRON: CPT | Performed by: INTERNAL MEDICINE

## 2022-11-07 PROCEDURE — 36415 COLL VENOUS BLD VENIPUNCTURE: CPT

## 2022-11-07 PROCEDURE — 99396 PREV VISIT EST AGE 40-64: CPT | Performed by: INTERNAL MEDICINE

## 2022-11-07 PROCEDURE — 82746 ASSAY OF FOLIC ACID SERUM: CPT | Performed by: INTERNAL MEDICINE

## 2022-11-07 PROCEDURE — 80061 LIPID PANEL: CPT

## 2022-11-07 PROCEDURE — 84450 TRANSFERASE (AST) (SGOT): CPT

## 2022-11-07 RX ORDER — CITALOPRAM 40 MG/1
40 TABLET ORAL DAILY
Qty: 90 TABLET | Refills: 3 | Status: SHIPPED | OUTPATIENT
Start: 2022-11-07

## 2022-11-07 RX ORDER — CITALOPRAM 40 MG/1
40 TABLET ORAL DAILY
COMMUNITY
End: 2022-11-07

## 2022-11-09 LAB — DEPRECATED HBV CORE AB SER IA-ACNC: 5.4 NG/ML

## 2022-11-11 ENCOUNTER — LAB ENCOUNTER (OUTPATIENT)
Dept: LAB | Age: 62
End: 2022-11-11
Attending: INTERNAL MEDICINE
Payer: COMMERCIAL

## 2022-11-11 DIAGNOSIS — R53.83 OTHER FATIGUE: ICD-10-CM

## 2022-11-11 DIAGNOSIS — R79.0 LOW FERRITIN: Primary | ICD-10-CM

## 2022-11-11 DIAGNOSIS — E55.9 VITAMIN D DEFICIENCY: ICD-10-CM

## 2022-11-11 DIAGNOSIS — Z00.00 LABORATORY EXAM ORDERED AS PART OF ROUTINE GENERAL MEDICAL EXAMINATION: ICD-10-CM

## 2022-11-11 DIAGNOSIS — R79.0 LOW FERRITIN: ICD-10-CM

## 2022-11-11 LAB
BASOPHILS # BLD AUTO: 0.1 X10(3) UL (ref 0–0.2)
BASOPHILS NFR BLD AUTO: 0.9 %
EOSINOPHIL # BLD AUTO: 0.23 X10(3) UL (ref 0–0.7)
EOSINOPHIL NFR BLD AUTO: 2.1 %
ERYTHROCYTE [DISTWIDTH] IN BLOOD BY AUTOMATED COUNT: 15.4 %
FOLATE SERPL-MCNC: 10.8 NG/ML (ref 8.7–?)
HCT VFR BLD AUTO: 37 %
HGB BLD-MCNC: 11.7 G/DL
IMM GRANULOCYTES # BLD AUTO: 0.05 X10(3) UL (ref 0–1)
IMM GRANULOCYTES NFR BLD: 0.5 %
IRON SATN MFR SERPL: 9 %
IRON SERPL-MCNC: 40 UG/DL
LYMPHOCYTES # BLD AUTO: 3.47 X10(3) UL (ref 1–4)
LYMPHOCYTES NFR BLD AUTO: 31.4 %
MCH RBC QN AUTO: 26.4 PG (ref 26–34)
MCHC RBC AUTO-ENTMCNC: 31.6 G/DL (ref 31–37)
MCV RBC AUTO: 83.5 FL
MONOCYTES # BLD AUTO: 0.74 X10(3) UL (ref 0.1–1)
MONOCYTES NFR BLD AUTO: 6.7 %
NEUTROPHILS # BLD AUTO: 6.45 X10 (3) UL (ref 1.5–7.7)
NEUTROPHILS # BLD AUTO: 6.45 X10(3) UL (ref 1.5–7.7)
NEUTROPHILS NFR BLD AUTO: 58.4 %
PLATELET # BLD AUTO: 425 10(3)UL (ref 150–450)
RBC # BLD AUTO: 4.43 X10(6)UL
TIBC SERPL-MCNC: 454 UG/DL (ref 240–450)
TRANSFERRIN SERPL-MCNC: 305 MG/DL (ref 200–360)
VIT B12 SERPL-MCNC: 459 PG/ML (ref 193–986)
VIT D+METAB SERPL-MCNC: 25.7 NG/ML (ref 30–100)
VIT D+METAB SERPL-MCNC: 29.1 NG/ML (ref 30–100)
WBC # BLD AUTO: 11 X10(3) UL (ref 4–11)

## 2022-11-11 PROCEDURE — 36415 COLL VENOUS BLD VENIPUNCTURE: CPT

## 2022-11-11 PROCEDURE — 85025 COMPLETE CBC W/AUTO DIFF WBC: CPT

## 2022-11-11 PROCEDURE — 82306 VITAMIN D 25 HYDROXY: CPT

## 2022-11-11 RX ORDER — ERGOCALCIFEROL 1.25 MG/1
50000 CAPSULE ORAL WEEKLY
Qty: 8 CAPSULE | Refills: 0 | Status: SHIPPED | OUTPATIENT
Start: 2022-11-11 | End: 2022-12-11

## 2022-11-13 DIAGNOSIS — D50.9 IRON DEFICIENCY ANEMIA, UNSPECIFIED IRON DEFICIENCY ANEMIA TYPE: Primary | ICD-10-CM

## 2022-11-14 RX ORDER — FLUTICASONE PROPIONATE 50 MCG
1 SPRAY, SUSPENSION (ML) NASAL DAILY
Qty: 16 G | Refills: 1 | Status: SHIPPED | OUTPATIENT
Start: 2022-11-14

## 2023-08-01 ENCOUNTER — LAB ENCOUNTER (OUTPATIENT)
Dept: LAB | Age: 63
End: 2023-08-01
Attending: INTERNAL MEDICINE
Payer: COMMERCIAL

## 2023-08-01 DIAGNOSIS — R53.83 OTHER FATIGUE: ICD-10-CM

## 2023-08-01 DIAGNOSIS — N17.9 AKI (ACUTE KIDNEY INJURY) (HCC): Primary | ICD-10-CM

## 2023-08-01 DIAGNOSIS — E78.2 MIXED HYPERLIPIDEMIA: ICD-10-CM

## 2023-08-01 DIAGNOSIS — D50.9 IRON DEFICIENCY ANEMIA, UNSPECIFIED IRON DEFICIENCY ANEMIA TYPE: ICD-10-CM

## 2023-08-01 DIAGNOSIS — E55.9 VITAMIN D DEFICIENCY: ICD-10-CM

## 2023-08-01 DIAGNOSIS — R73.03 PREDIABETES: ICD-10-CM

## 2023-08-01 PROBLEM — F32.9 MDD (MAJOR DEPRESSIVE DISORDER): Status: RESOLVED | Noted: 2019-10-01 | Resolved: 2023-08-01

## 2023-08-01 LAB
ALBUMIN SERPL-MCNC: 3.9 G/DL (ref 3.4–5)
ALBUMIN/GLOB SERPL: 1.1 {RATIO} (ref 1–2)
ALP LIVER SERPL-CCNC: 71 U/L
ALT SERPL-CCNC: 23 U/L
ANION GAP SERPL CALC-SCNC: 3 MMOL/L (ref 0–18)
AST SERPL-CCNC: 19 U/L (ref 15–37)
BASOPHILS # BLD AUTO: 0.08 X10(3) UL (ref 0–0.2)
BASOPHILS NFR BLD AUTO: 1.1 %
BILIRUB SERPL-MCNC: 0.6 MG/DL (ref 0.1–2)
BUN BLD-MCNC: 16 MG/DL (ref 7–18)
CALCIUM BLD-MCNC: 9.2 MG/DL (ref 8.5–10.1)
CHLORIDE SERPL-SCNC: 107 MMOL/L (ref 98–112)
CHOLEST SERPL-MCNC: 207 MG/DL (ref ?–200)
CO2 SERPL-SCNC: 28 MMOL/L (ref 21–32)
CREAT BLD-MCNC: 0.99 MG/DL
CRP SERPL-MCNC: <0.29 MG/DL (ref ?–0.3)
DEPRECATED HBV CORE AB SER IA-ACNC: 16.1 NG/ML
EGFRCR SERPLBLD CKD-EPI 2021: 65 ML/MIN/1.73M2 (ref 60–?)
EOSINOPHIL # BLD AUTO: 0.19 X10(3) UL (ref 0–0.7)
EOSINOPHIL NFR BLD AUTO: 2.6 %
ERYTHROCYTE [DISTWIDTH] IN BLOOD BY AUTOMATED COUNT: 12.9 %
ERYTHROCYTE [SEDIMENTATION RATE] IN BLOOD: 14 MM/HR
EST. AVERAGE GLUCOSE BLD GHB EST-MCNC: 117 MG/DL (ref 68–126)
FASTING PATIENT LIPID ANSWER: YES
FASTING STATUS PATIENT QL REPORTED: YES
GLOBULIN PLAS-MCNC: 3.6 G/DL (ref 2.8–4.4)
GLUCOSE BLD-MCNC: 95 MG/DL (ref 70–99)
HBA1C MFR BLD: 5.7 % (ref ?–5.7)
HCT VFR BLD AUTO: 41.3 %
HDLC SERPL-MCNC: 71 MG/DL (ref 40–59)
HGB BLD-MCNC: 13.6 G/DL
IMM GRANULOCYTES # BLD AUTO: 0.01 X10(3) UL (ref 0–1)
IMM GRANULOCYTES NFR BLD: 0.1 %
IRON SATN MFR SERPL: 26 %
IRON SERPL-MCNC: 106 UG/DL
LDLC SERPL CALC-MCNC: 120 MG/DL (ref ?–100)
LYMPHOCYTES # BLD AUTO: 1.95 X10(3) UL (ref 1–4)
LYMPHOCYTES NFR BLD AUTO: 26.6 %
MCH RBC QN AUTO: 28.8 PG (ref 26–34)
MCHC RBC AUTO-ENTMCNC: 32.9 G/DL (ref 31–37)
MCV RBC AUTO: 87.3 FL
MONOCYTES # BLD AUTO: 0.55 X10(3) UL (ref 0.1–1)
MONOCYTES NFR BLD AUTO: 7.5 %
NEUTROPHILS # BLD AUTO: 4.56 X10 (3) UL (ref 1.5–7.7)
NEUTROPHILS # BLD AUTO: 4.56 X10(3) UL (ref 1.5–7.7)
NEUTROPHILS NFR BLD AUTO: 62.1 %
NONHDLC SERPL-MCNC: 136 MG/DL (ref ?–130)
OSMOLALITY SERPL CALC.SUM OF ELEC: 287 MOSM/KG (ref 275–295)
PLATELET # BLD AUTO: 389 10(3)UL (ref 150–450)
POTASSIUM SERPL-SCNC: 4.3 MMOL/L (ref 3.5–5.1)
PROT SERPL-MCNC: 7.5 G/DL (ref 6.4–8.2)
PTH-INTACT SERPL-MCNC: 37 PG/ML (ref 18.5–88)
RBC # BLD AUTO: 4.73 X10(6)UL
SODIUM SERPL-SCNC: 138 MMOL/L (ref 136–145)
TIBC SERPL-MCNC: 413 UG/DL (ref 240–450)
TRANSFERRIN SERPL-MCNC: 277 MG/DL (ref 200–360)
TRIGL SERPL-MCNC: 89 MG/DL (ref 30–149)
TSI SER-ACNC: 1.97 MIU/ML (ref 0.36–3.74)
VIT D+METAB SERPL-MCNC: 31 NG/ML (ref 30–100)
VLDLC SERPL CALC-MCNC: 16 MG/DL (ref 0–30)
WBC # BLD AUTO: 7.3 X10(3) UL (ref 4–11)

## 2023-08-01 PROCEDURE — 83970 ASSAY OF PARATHORMONE: CPT | Performed by: INTERNAL MEDICINE

## 2023-08-01 PROCEDURE — 80061 LIPID PANEL: CPT | Performed by: INTERNAL MEDICINE

## 2023-08-01 PROCEDURE — 86140 C-REACTIVE PROTEIN: CPT | Performed by: INTERNAL MEDICINE

## 2023-08-01 PROCEDURE — 83550 IRON BINDING TEST: CPT | Performed by: INTERNAL MEDICINE

## 2023-08-01 PROCEDURE — 83540 ASSAY OF IRON: CPT | Performed by: INTERNAL MEDICINE

## 2023-08-01 PROCEDURE — 83036 HEMOGLOBIN GLYCOSYLATED A1C: CPT | Performed by: INTERNAL MEDICINE

## 2023-08-01 PROCEDURE — 82728 ASSAY OF FERRITIN: CPT | Performed by: INTERNAL MEDICINE

## 2023-08-01 PROCEDURE — 82306 VITAMIN D 25 HYDROXY: CPT | Performed by: INTERNAL MEDICINE

## 2023-08-01 PROCEDURE — 80050 GENERAL HEALTH PANEL: CPT | Performed by: INTERNAL MEDICINE

## 2023-08-01 PROCEDURE — 85652 RBC SED RATE AUTOMATED: CPT | Performed by: INTERNAL MEDICINE

## 2023-11-26 NOTE — ED NOTES
Pt called stating that the tobramycin is on back order from the pharmacy. MD notified and orders for meds sent to pharmacy. Pt notified. DC instructions

## 2024-04-30 ENCOUNTER — TELEPHONE (OUTPATIENT)
Dept: INTERNAL MEDICINE CLINIC | Facility: CLINIC | Age: 64
End: 2024-04-30

## 2024-04-30 DIAGNOSIS — Z12.31 SCREENING MAMMOGRAM FOR BREAST CANCER: Primary | ICD-10-CM

## 2024-05-13 ENCOUNTER — HOSPITAL ENCOUNTER (OUTPATIENT)
Dept: MAMMOGRAPHY | Age: 64
Discharge: HOME | End: 2024-05-13
Attending: INTERNAL MEDICINE

## 2024-05-13 DIAGNOSIS — Z12.31 SCREENING MAMMOGRAM FOR BREAST CANCER: ICD-10-CM

## 2024-05-13 DIAGNOSIS — C50.919 FAMILIAL CANCER OF BREAST, UNSPECIFIED LATERALITY (HCC): Primary | ICD-10-CM

## 2024-05-13 PROCEDURE — 77067 SCR MAMMO BI INCL CAD: CPT | Performed by: INTERNAL MEDICINE

## 2024-05-13 PROCEDURE — 77063 BREAST TOMOSYNTHESIS BI: CPT | Performed by: INTERNAL MEDICINE

## 2024-05-14 ENCOUNTER — PATIENT MESSAGE (OUTPATIENT)
Dept: INTERNAL MEDICINE CLINIC | Facility: CLINIC | Age: 64
End: 2024-05-14

## 2024-05-14 NOTE — TELEPHONE ENCOUNTER
From: Niurka Murphy  To: Quiana Hung  Sent: 5/14/2024 11:26 AM CDT  Subject: Mammo    Hi Dr Hung,  I was genetically tested in 2014, attached are my results:)

## 2024-05-14 NOTE — TELEPHONE ENCOUNTER
See MCM from patient.     Brenden Bah,  Although your mammogram results were normal but based on the questionnaire you answered the radiologist recommends that she follow-up with the high risk breast cancer clinic.  The clinic consists of an appointment with the genetic specialist and sometimes they do additional testing such as MRI or ultrasound of the breasts.  I have placed a referral in case you are interested.  Quiana Hung DO   Written by Quiana Hung DO on 5/13/2024  3:34 PM CDT  Seen by patient Niurka Murphy on 5/14/2024  9:30 AM

## 2024-07-29 PROBLEM — Z12.11 SPECIAL SCREENING FOR MALIGNANT NEOPLASMS, COLON: Status: ACTIVE | Noted: 2024-07-29

## 2024-07-29 PROBLEM — D12.3 BENIGN NEOPLASM OF TRANSVERSE COLON: Status: ACTIVE | Noted: 2024-07-29

## 2024-11-19 ENCOUNTER — TELEPHONE (OUTPATIENT)
Dept: INTERNAL MEDICINE CLINIC | Facility: CLINIC | Age: 64
End: 2024-11-19

## 2024-11-19 DIAGNOSIS — Z00.00 ROUTINE PHYSICAL EXAMINATION: Primary | ICD-10-CM

## 2024-11-19 DIAGNOSIS — E55.9 VITAMIN D DEFICIENCY: ICD-10-CM

## 2024-12-21 ENCOUNTER — LAB ENCOUNTER (OUTPATIENT)
Dept: LAB | Age: 64
End: 2024-12-21
Attending: INTERNAL MEDICINE
Payer: COMMERCIAL

## 2024-12-21 DIAGNOSIS — E55.9 VITAMIN D DEFICIENCY: ICD-10-CM

## 2024-12-21 DIAGNOSIS — Z00.00 ROUTINE PHYSICAL EXAMINATION: ICD-10-CM

## 2024-12-21 LAB
ALT SERPL-CCNC: 21 U/L
ANION GAP SERPL CALC-SCNC: 8 MMOL/L (ref 0–18)
AST SERPL-CCNC: 30 U/L (ref ?–34)
BASOPHILS # BLD AUTO: 0.12 X10(3) UL (ref 0–0.2)
BASOPHILS NFR BLD AUTO: 1.2 %
BILIRUB UR QL STRIP.AUTO: NEGATIVE
BUN BLD-MCNC: 19 MG/DL (ref 9–23)
CALCIUM BLD-MCNC: 9.9 MG/DL (ref 8.7–10.4)
CHLORIDE SERPL-SCNC: 105 MMOL/L (ref 98–112)
CHOLEST SERPL-MCNC: 229 MG/DL (ref ?–200)
CLARITY UR REFRACT.AUTO: CLEAR
CO2 SERPL-SCNC: 28 MMOL/L (ref 21–32)
COLOR UR AUTO: YELLOW
CREAT BLD-MCNC: 0.79 MG/DL
EGFRCR SERPLBLD CKD-EPI 2021: 85 ML/MIN/1.73M2 (ref 60–?)
EOSINOPHIL # BLD AUTO: 0.21 X10(3) UL (ref 0–0.7)
EOSINOPHIL NFR BLD AUTO: 2.1 %
ERYTHROCYTE [DISTWIDTH] IN BLOOD BY AUTOMATED COUNT: 13.9 %
EST. AVERAGE GLUCOSE BLD GHB EST-MCNC: 117 MG/DL (ref 68–126)
FASTING PATIENT LIPID ANSWER: YES
FASTING STATUS PATIENT QL REPORTED: YES
GLUCOSE BLD-MCNC: 72 MG/DL (ref 70–99)
GLUCOSE UR STRIP.AUTO-MCNC: NORMAL MG/DL
HBA1C MFR BLD: 5.7 % (ref ?–5.7)
HCT VFR BLD AUTO: 41.3 %
HDLC SERPL-MCNC: 76 MG/DL (ref 40–59)
HGB BLD-MCNC: 13.1 G/DL
IMM GRANULOCYTES # BLD AUTO: 0.03 X10(3) UL (ref 0–1)
IMM GRANULOCYTES NFR BLD: 0.3 %
KETONES UR STRIP.AUTO-MCNC: NEGATIVE MG/DL
LDLC SERPL CALC-MCNC: 135 MG/DL (ref ?–100)
LEUKOCYTE ESTERASE UR QL STRIP.AUTO: NEGATIVE
LYMPHOCYTES # BLD AUTO: 2.59 X10(3) UL (ref 1–4)
LYMPHOCYTES NFR BLD AUTO: 26.3 %
MCH RBC QN AUTO: 27.4 PG (ref 26–34)
MCHC RBC AUTO-ENTMCNC: 31.7 G/DL (ref 31–37)
MCV RBC AUTO: 86.4 FL
MONOCYTES # BLD AUTO: 0.72 X10(3) UL (ref 0.1–1)
MONOCYTES NFR BLD AUTO: 7.3 %
NEUTROPHILS # BLD AUTO: 6.17 X10 (3) UL (ref 1.5–7.7)
NEUTROPHILS # BLD AUTO: 6.17 X10(3) UL (ref 1.5–7.7)
NEUTROPHILS NFR BLD AUTO: 62.8 %
NITRITE UR QL STRIP.AUTO: NEGATIVE
NONHDLC SERPL-MCNC: 153 MG/DL (ref ?–130)
OSMOLALITY SERPL CALC.SUM OF ELEC: 293 MOSM/KG (ref 275–295)
PH UR STRIP.AUTO: 6 [PH] (ref 5–8)
PLATELET # BLD AUTO: 392 10(3)UL (ref 150–450)
POTASSIUM SERPL-SCNC: 4.3 MMOL/L (ref 3.5–5.1)
RBC # BLD AUTO: 4.78 X10(6)UL
RBC UR QL AUTO: NEGATIVE
SODIUM SERPL-SCNC: 141 MMOL/L (ref 136–145)
SP GR UR STRIP.AUTO: 1.03 (ref 1–1.03)
TRIGL SERPL-MCNC: 105 MG/DL (ref 30–149)
TSI SER-ACNC: 1.8 UIU/ML (ref 0.55–4.78)
UROBILINOGEN UR STRIP.AUTO-MCNC: NORMAL MG/DL
VIT D+METAB SERPL-MCNC: 28 NG/ML (ref 30–100)
VLDLC SERPL CALC-MCNC: 19 MG/DL (ref 0–30)
WBC # BLD AUTO: 9.8 X10(3) UL (ref 4–11)

## 2024-12-21 PROCEDURE — 82306 VITAMIN D 25 HYDROXY: CPT | Performed by: INTERNAL MEDICINE

## 2024-12-21 PROCEDURE — 84460 ALANINE AMINO (ALT) (SGPT): CPT | Performed by: INTERNAL MEDICINE

## 2024-12-21 PROCEDURE — 84443 ASSAY THYROID STIM HORMONE: CPT | Performed by: INTERNAL MEDICINE

## 2024-12-21 PROCEDURE — 85025 COMPLETE CBC W/AUTO DIFF WBC: CPT | Performed by: INTERNAL MEDICINE

## 2024-12-21 PROCEDURE — 80048 BASIC METABOLIC PNL TOTAL CA: CPT | Performed by: INTERNAL MEDICINE

## 2024-12-21 PROCEDURE — 83036 HEMOGLOBIN GLYCOSYLATED A1C: CPT | Performed by: INTERNAL MEDICINE

## 2024-12-21 PROCEDURE — 80061 LIPID PANEL: CPT | Performed by: INTERNAL MEDICINE

## 2024-12-21 PROCEDURE — 81003 URINALYSIS AUTO W/O SCOPE: CPT | Performed by: INTERNAL MEDICINE

## 2024-12-21 PROCEDURE — 84450 TRANSFERASE (AST) (SGOT): CPT | Performed by: INTERNAL MEDICINE

## 2025-01-13 ENCOUNTER — OFFICE VISIT (OUTPATIENT)
Dept: INTERNAL MEDICINE CLINIC | Facility: CLINIC | Age: 65
End: 2025-01-13
Payer: COMMERCIAL

## 2025-01-13 VITALS
RESPIRATION RATE: 16 BRPM | TEMPERATURE: 98 F | OXYGEN SATURATION: 98 % | HEIGHT: 64.5 IN | SYSTOLIC BLOOD PRESSURE: 106 MMHG | BODY MASS INDEX: 23.55 KG/M2 | WEIGHT: 139.63 LBS | HEART RATE: 76 BPM | DIASTOLIC BLOOD PRESSURE: 70 MMHG

## 2025-01-13 DIAGNOSIS — J30.2 SEASONAL ALLERGIES: ICD-10-CM

## 2025-01-13 DIAGNOSIS — H35.352 CYSTOID MACULAR EDEMA OF LEFT EYE: ICD-10-CM

## 2025-01-13 DIAGNOSIS — F33.0 MAJOR DEPRESSIVE DISORDER, RECURRENT, MILD (HCC): ICD-10-CM

## 2025-01-13 DIAGNOSIS — Z12.4 SCREENING FOR CERVICAL CANCER: ICD-10-CM

## 2025-01-13 DIAGNOSIS — Z00.00 ROUTINE PHYSICAL EXAMINATION: Primary | ICD-10-CM

## 2025-01-13 PROBLEM — H90.5 SENSORINEURAL HEARING LOSS: Status: RESOLVED | Noted: 2017-11-13 | Resolved: 2025-01-13

## 2025-01-13 PROCEDURE — 87624 HPV HI-RISK TYP POOLED RSLT: CPT | Performed by: INTERNAL MEDICINE

## 2025-01-13 RX ORDER — FLUTICASONE PROPIONATE 50 MCG
1 SPRAY, SUSPENSION (ML) NASAL DAILY
Qty: 16 G | Refills: 1 | Status: SHIPPED | OUTPATIENT
Start: 2025-01-13

## 2025-01-13 RX ORDER — CITALOPRAM HYDROBROMIDE 40 MG/1
40 TABLET ORAL DAILY
Qty: 90 TABLET | Refills: 3 | Status: SHIPPED | OUTPATIENT
Start: 2025-01-13

## 2025-01-13 NOTE — PATIENT INSTRUCTIONS
Continue to exercise at least 150 minutes a week and Eat a plant based diet     Please take 2000 IU of vitamin D daily for life to keep your bones strong    Please see your dentist every 6 months  Continue with regular eye exams     We did your Pap smear today    Continue medication and I will see you back in 1 year    You received the pneumonia vaccine today and it may cause some soreness of the arm for 24 hours.

## 2025-01-16 LAB — HPV E6+E7 MRNA CVX QL NAA+PROBE: NEGATIVE

## 2025-04-21 ENCOUNTER — HOSPITAL ENCOUNTER (OUTPATIENT)
Age: 65
Discharge: HOME | End: 2025-04-21
Payer: COMMERCIAL

## 2025-04-21 VITALS
SYSTOLIC BLOOD PRESSURE: 117 MMHG | RESPIRATION RATE: 18 BRPM | DIASTOLIC BLOOD PRESSURE: 78 MMHG | HEIGHT: 66 IN | OXYGEN SATURATION: 100 % | BODY MASS INDEX: 22.02 KG/M2 | HEART RATE: 73 BPM | WEIGHT: 137 LBS | TEMPERATURE: 97 F

## 2025-04-21 DIAGNOSIS — M62.838 TRAPEZIUS MUSCLE SPASM: Primary | ICD-10-CM

## 2025-04-21 PROCEDURE — 99203 OFFICE O/P NEW LOW 30 MIN: CPT

## 2025-04-21 PROCEDURE — 99213 OFFICE O/P EST LOW 20 MIN: CPT

## 2025-04-21 RX ORDER — METHYLPREDNISOLONE 4 MG/1
TABLET ORAL
Qty: 1 EACH | Refills: 0 | Status: SHIPPED | OUTPATIENT
Start: 2025-04-21

## 2025-04-21 RX ORDER — LIDOCAINE 50 MG/G
1 PATCH TOPICAL EVERY 24 HOURS
Qty: 5 PATCH | Refills: 0 | Status: SHIPPED | OUTPATIENT
Start: 2025-04-21 | End: 2025-04-26

## 2025-04-21 RX ORDER — CYCLOBENZAPRINE HCL 10 MG
10 TABLET ORAL 3 TIMES DAILY PRN
Qty: 20 TABLET | Refills: 0 | Status: SHIPPED | OUTPATIENT
Start: 2025-04-21 | End: 2025-04-28

## 2025-04-21 NOTE — DISCHARGE INSTRUCTIONS
Please do not drive while taking muscle relaxer as it may cause drowsiness.  You may find benefit in seeking a deep tissue massage to the trapezius region.

## 2025-04-21 NOTE — ED PROVIDER NOTES
Patient Seen in: [facility]      History   No chief complaint on file.    Stated Complaint: neck pain    Subjective:   HPI    64-year-old female presents today with complaints of left trapezius pain for the last couple days.  Patient states she took a yoga class and suddenly started to have pain in this area.  Patient denies any known trauma to the area.  History of Present Illness               Objective:     No pertinent past medical history.            No pertinent past surgical history.              No pertinent social history.            Review of Systems   Constitutional: Negative.    HENT: Negative.     Eyes: Negative.    Respiratory: Negative.     Cardiovascular: Negative.    Gastrointestinal: Negative.    Endocrine: Negative.    Genitourinary: Negative.    Musculoskeletal:  Positive for myalgias and neck pain.   Skin: Negative.    Allergic/Immunologic: Negative.    Neurological: Negative.    Hematological: Negative.    Psychiatric/Behavioral: Negative.         Positive for stated complaint: neck pain  Other systems are as noted in HPI.  Constitutional and vital signs reviewed.      All other systems reviewed and negative except as noted above.                  Physical Exam     ED Triage Vitals [04/21/25 1100]   /78   Pulse 73   Resp 18   Temp 97.4 °F (36.3 °C)   Temp src Oral   SpO2 100 %   O2 Device None (Room air)       Current Vitals:   Vital Signs  BP: 117/78  Pulse: 73  Resp: 18  Temp: 97.4 °F (36.3 °C)  Temp src: Oral    Oxygen Therapy  SpO2: 100 %  O2 Device: None (Room air)        Physical Exam  Vitals and nursing note reviewed.   Constitutional:       Appearance: Normal appearance. She is normal weight.   HENT:      Head: Normocephalic and atraumatic.      Right Ear: External ear normal.      Left Ear: External ear normal.   Eyes:      Extraocular Movements: Extraocular movements intact.      Conjunctiva/sclera: Conjunctivae normal.      Pupils: Pupils are equal, round, and  reactive to light.   Neck:      Comments: Palpable spasm appreciated to the posterior aspect of the left trapezius.  Bilateral  strength 2+.  Left trapezius is sitting higher than right.  Negative for lymphadenopathy.  Pulmonary:      Effort: Pulmonary effort is normal.   Musculoskeletal:         General: Normal range of motion.      Cervical back: Normal range of motion. No rigidity.   Skin:     General: Skin is warm.   Neurological:      Mental Status: She is alert.   Psychiatric:         Mood and Affect: Mood normal.         Physical Exam                ED Course   Labs Reviewed - No data to display       Results                                 MDM      64-year-old female presents today with complaints of left trapezius pain for the last couple days.  Patient states she took a yoga class and suddenly started to have pain in this area.  Patient denies any known trauma to the area.  Vital signs: Please see EMR.  Physical exam: Please see exam.  Differential diagnosis: Trapezius spasm, trapezius strain, cervicalalgia.  Based on physical exam and HPI will diagnose with trapezius spasm.  Will prescribe lidocaine patch, steroid pack and muscle relaxer.  Patient is to follow-up with primary care provider.  ED precautions given.      Medical Decision Making  64-year-old female presents today with complaints of left trapezius pain for the last couple days.  Patient states she took a yoga class and suddenly started to have pain in this area.  Patient denies any known trauma to the area.    Problems Addressed:  Trapezius muscle spasm: acute illness or injury    Amount and/or Complexity of Data Reviewed  ECG/medicine tests: ordered. Decision-making details documented in ED Course.    Risk  Prescription drug management.        Disposition and Plan     Clinical Impression:  1. Trapezius muscle spasm         Disposition:  Discharge  4/21/2025 11:17 am    Follow-up:  Quiana Hung DO  [address]  [facility] [address]  [phone]    In 1 week            Medications Prescribed:  Current Discharge Medication List        START taking these medications    Details   methylPREDNISolone (MEDROL) 4 MG Oral Tablet Therapy Pack Dosepack: take as directed  Qty: 1 each, Refills: 0      cyclobenzaprine 10 MG Oral Tab Take 1 tablet (10 mg total) by mouth 3 (three) times daily as needed for Muscle spasms.  Qty: 20 tablet, Refills: 0      lidocaine 5 % External Patch Place 1 patch onto the skin daily for 5 days.  Qty: 5 patch, Refills: 0             Supplementary Documentation:

## 2025-05-13 NOTE — PROGRESS NOTES
Dx: S/P RIght RCR, Biceps tenodesis and acromioplasty           Authorized # of Visits:  20  Fall Risk: standard         Precautions: n/a          Subjective:   Patient states she is better today. She has not been forcing the overhead motion  Current Pain Ratin/10  Objective:   Changed MT exercise to rhomboids. Shoulder flexion/abduction with very light assist eliminates shoulder hike. Assessment:  Patient tolerated the session well. ROM remains well WNL.       Plan:   Progressive strengthening as tolerated     Date: 2022  Tx#:  Date: 2022  Tx#: 3/20 Date:   2022  Tx#:  Date: 2022  Tx#:  Date: 2022  Tx#:  Date: 2022  Tx#:  Date: 2022  Tx#:    TherEx TherEx TherEx TherEx TherEx TherEx TherEx   Supine PROM right shoulder and elbow as tolerated UBE 6 minutes  UBE x 6 min UBE 6 minutes  UBE 6 minutes  UBE 8 minutes  UBE 6 minutes    Pendulums x 2 min Prone shoulder extension 10 reps x 3, 3 second hold (HEP) Supine active shoulder flexion x 15 Base position right shoulder IR/ER manual resisted  Right shoulder PROM Prone shoulder extension 10 reps x 2 3 second hold Pone shoulder extension 10 reps x 2 3 seconds each    Elbow AROM flexion/extension Prone MT 10 reps x 3, 3 second hold (HEP) S/L shoulder abduction 2 x 15 Base position ER with yellow band 10 reps x 2 (HEP) SL right shoulder ER 1 pound 10 reps x 1   Prone MT > 20 reps 3 second hold  Prone rhomboids  10 reps x 2 3 second hold vs MT    Reviewed HEP 45 degree abduction IR/ER isometrics 10 reps x 3, 3 second hold  S/L shoulder flexion x 15 Base position right shoulder IR with yellow band 10 reps x 2 (HEP) Rhythmic stabilization at 90 in SL and supine > 10 reps x 2 each  SL right shoulder abduction 90 degrees 10 reps x 3  Quadruped review of UE reaches    - Bicep isometrics 10 reps x 3, 3 second hold  S/L shoulder ER scap retraction with green band > 15 reps  (HEP) Body blade at side 15 seconds x 5, bilat hold shoulder height 15 seconds x 5  Supine rhythmic stabilization at 90 flexion proximal hand placement > 20 reps each SL ER 10 reps x 2 each   - scap plane abduction with visual feedback below shoulder hike ROM approx 10 reps x 2, 3 second -  hold (HEP) Seated OH pulleys shoulder flexion and horizontal abd with ER Triceps with yellow band (HEP) patient may utilize green band for HEP Bilateral pulldown 48 pounds 10 reps x 3  Bicep curl 9 pound bar 10 reps x 3  Isometric IR/ER base position 10 reps x 3 with 3-5 second hold SL right shoulder abduction to 90 10 x 2         SL right shoulder arm bar rhythmic stabilization, SL 3 pound arm bar rhythmic stabilization > 20 seconds x 3   - \"V\" wall wash with pillowcase (HEP) Supine rhythmic stabilization at 90 degrees x 2 Supine right shoulder at 90 rhythmic stabilization 20 reps each  High cable pull 36 pounds 10 reps x 3  Seated isometric abduction/flexion base position > 20 reps each SL ER 10 reps x 2 right UE   -  Prone I and T 2 x to fatigue with 5 sec holds Wall press alternating arms  Cable IR one plate 10 reps x 2 Right shoulder wall slides with pillow case Seated row cable  72 pounds 10 reps x 3    -  Reviewed HEP Quadruped - no pain WB with opposite arm lift, no pain, no pain with standing forward bending hands on floor WB  Prone shoulder extension 10 reps x 2  Body blade 15  Seconds x 5 at side right UE Isometric IR/ER 30 degrees abduction 10 reps x 3        Prone MT 10 reps x 2   Bicep curl bilat hold 9 pound bar 10 reps x 3        Charges:  TherEx x 3       Total Timed Treatment: 45 min  Total Treatment Time: 45 min No

## 2025-07-02 DIAGNOSIS — J30.2 SEASONAL ALLERGIES: ICD-10-CM

## 2025-07-02 NOTE — TELEPHONE ENCOUNTER
Name from pharmacy: Fluticasone Propionate Nasal Suspension 50 MCG/ACT         Will file in chart as: FLUTICASONE PROPIONATE 50 MCG/ACT Nasal Suspension    Sig: spray 1 spray into each nostril daily    Disp: 16 g    Refills: 0    Start: 7/2/2025    Class: Normal    Non-formulary For: Seasonal allergies    Last ordered: 5 months ago (1/13/2025) by Quiana Hung DO    Last refill: 4/7/2025    Rx #: [id number]    Allergy Medication Protocol [id number]/02/2025 11:16 AM   Protocol Details Medication is active on med list    In person appointment or virtual visit in the past 12 mos or appointment in next 3 mos      To be filled at: [facility] [address] [place] [address] [address] [facility] [phone], [phone]          LOV:  1/13/25  RTC:   1 y  Recent Labs: 12/21/24    Upcoming OV none

## 2025-07-03 RX ORDER — FLUTICASONE PROPIONATE 50 MCG
1 SPRAY, SUSPENSION (ML) NASAL DAILY
Qty: 16 G | Refills: 0 | Status: SHIPPED | OUTPATIENT
Start: 2025-07-03

## (undated) NOTE — LETTER
Date & Time: 12/26/2019, 10:37 AM  Patient: Milvia Late  Encounter Provider(s):    Alec Overton MD       To Whom It May Concern:    Yesenia Atkins was seen and treated in our department on 12/26/2019.  She should not return to work until 12/29/1

## (undated) NOTE — LETTER
[facility] [place]  [address] [address]  Dept: [phone]  Dept Fax: [phone]         January 31, 2017    Patient: Deo Connell   YOB: 1960   Date of Visit: 1/31/2017       To Whom It May Emely

## (undated) NOTE — LETTER
Patient Name: Rajinder Castro  YOB: 1960          MRN :  [id number]  Date:  2022  Referring Physician:  Jc RAI     Progress Summary  Pt has attended 9 visits in Physical Therapy. Subjective:   Patient states she is better today. She has not been forcing the overhead motion  Current Pain Ratin/10  Objective:   Right shoulder AROM WNL - no pain. There is mild shoulder hiking primarily with scap plane abduction when moving above 90 degrees. With AAROM - hiking is eliminated   Right UE strength:   Flexion/abduction 3/5, IR  4/5, ER 3+/5, MT 3+/5, LT 3/5. Biceps 5/5. She is able to perform bilateral curl with minimum of 10 pounds with no pain. Suitcase carry 20 pounds. : right = 45 pounds, left = 55 pounds. Assessment:  Patient is making progress with strengthening. Primary problem is rotator cuff weakness. Based on current strength, she is not ready for any overhead lifting. Patient  demonstrates understanding of need for additional time/rotator cuff strength in order to improve overhead reaching/lifting. Plan: Continue skilled Physical Therapy 2 x/week or a total of 8 visits over a 90 day period. Treatment will include: progressive strengthening        Patient/Family/Caregiver was advised of these findings, precautions, and treatment options and has agreed to actively participate in planning and for this course of care. Thank you for your referral. If you have any questions, please contact me at Dept: [phone]. Sincerely,  Electronically signed by therapist: Aixa Wylie PT     Physician's certification required:  Yes  Please co-sign or sign and return this letter via fax as soon as possible to [phone]. I certify the need for these services furnished under this plan of treatment and while under my care.     X___________________________________________________ Date____________________    Certification From:   To:10/25/2022            21st Century Cures Act Notice to Patient: Medical documents like this are made available to patients in the interest of transparency. However, be advised this is a medical document and it is intended as [id number] communication between your medical providers. This medical document may contain abbreviations, assessments, medical data, and results or other terms that are unfamiliar. Medical documents are intended to carry relevant information, facts as evident, and the clinical opinion of the practitioner. As such, this medical document may be written in language that appears blunt or direct. You are encouraged to contact your medical provider and/or [facility] Experience if you have any questions about this medical document.

## (undated) NOTE — LETTER
10/15/20        [address] [place] [id number]      Dear Antonieta Sam,    [facility] records indicate that you have outstanding lab work and or testing that was ordered for you and has not yet been completed: Fasting Lab Work   To keep o

## (undated) NOTE — LETTER
Date & Time: 3/21/2019, 8:43 AM  Patient: Lars Marc  Encounter Provider(s):    Deonna Perez MD       To Whom It May Concern:    Kenneth Byrne was seen and treated in our department on 3/21/2019. She can return to work on 03/25/19.

## (undated) NOTE — ED AVS SNAPSHOT
[facility] in [address]    [address] [facility]    Phone:  [phone]    Fax:  [address] [facility]   MRN: [id number]    Department:  [facility] [place] [facility] in [address]   Date of Visit:  1/31/2017 doctor. Please ask your health care provider, pharmacist or nurse if you have any questions regarding your home medications, including potential side effects.               Medication List      START taking these medications     * Albuterol Sulfate  Use your home nebulizer with albuterol every 4 hours as needed for chest tightness, shortness of breath or wheezing. When you are out of the home you may use your pro-air inhaler. Carry your inhaler with you.   Tomorrow start prednisone 40 mg once daily w from our patient liason soon after your visit. Also, some patients receive a detailed feedback survey mailed to them a week after the visit. If you receive this, we would really appreciate it if you could take the time to complete it. Thank you!       [facility] ([address]) [facility] [address] [facility]. ([address]) [facility] [address] ([address] [facility]  Jennifer Han ([facility] [address]

## (undated) NOTE — MR AVS SNAPSHOT
[place]  [address] [facility]  [address] [address] [phone]               Thank you for choosing us for your health care visit with Iram Street MD.  We are glad to serve you and happy to provide you with this summa taking this medication, and follow the directions you see here. Commonly known as:  PROAIR HFA           ZYRTEC ALLERGY 10 MG Tabs   Generic drug:  cetirizine   Take 1 tablet by mouth daily.                    Today's Orders     HGB A1C - CPX    Complete